# Patient Record
Sex: MALE | Race: OTHER | HISPANIC OR LATINO | URBAN - METROPOLITAN AREA
[De-identification: names, ages, dates, MRNs, and addresses within clinical notes are randomized per-mention and may not be internally consistent; named-entity substitution may affect disease eponyms.]

---

## 2018-06-03 ENCOUNTER — INPATIENT (INPATIENT)
Facility: HOSPITAL | Age: 43
LOS: 1 days | Discharge: HOME HEALTH | DRG: 348 | End: 2018-06-05
Attending: SPECIALIST | Admitting: SPECIALIST
Payer: MEDICAID

## 2018-06-03 ENCOUNTER — TRANSCRIPTION ENCOUNTER (OUTPATIENT)
Age: 43
End: 2018-06-03

## 2018-06-03 VITALS
RESPIRATION RATE: 18 BRPM | SYSTOLIC BLOOD PRESSURE: 120 MMHG | DIASTOLIC BLOOD PRESSURE: 79 MMHG | HEART RATE: 78 BPM | TEMPERATURE: 98 F | HEIGHT: 69 IN | OXYGEN SATURATION: 99 % | WEIGHT: 240.08 LBS

## 2018-06-03 DIAGNOSIS — E66.9 OBESITY, UNSPECIFIED: ICD-10-CM

## 2018-06-03 DIAGNOSIS — I00 RHEUMATIC FEVER WITHOUT HEART INVOLVEMENT: ICD-10-CM

## 2018-06-03 DIAGNOSIS — K61.0 ANAL ABSCESS: ICD-10-CM

## 2018-06-03 DIAGNOSIS — Z98.61 CORONARY ANGIOPLASTY STATUS: ICD-10-CM

## 2018-06-03 DIAGNOSIS — I50.22 CHRONIC SYSTOLIC (CONGESTIVE) HEART FAILURE: ICD-10-CM

## 2018-06-03 DIAGNOSIS — I25.10 ATHEROSCLEROTIC HEART DISEASE OF NATIVE CORONARY ARTERY WITHOUT ANGINA PECTORIS: ICD-10-CM

## 2018-06-03 DIAGNOSIS — I11.0 HYPERTENSIVE HEART DISEASE WITH HEART FAILURE: ICD-10-CM

## 2018-06-03 DIAGNOSIS — Z95.3 PRESENCE OF XENOGENIC HEART VALVE: ICD-10-CM

## 2018-06-03 DIAGNOSIS — Z95.2 PRESENCE OF PROSTHETIC HEART VALVE: Chronic | ICD-10-CM

## 2018-06-03 DIAGNOSIS — K61.1 RECTAL ABSCESS: ICD-10-CM

## 2018-06-03 DIAGNOSIS — L02.31 CUTANEOUS ABSCESS OF BUTTOCK: ICD-10-CM

## 2018-06-03 DIAGNOSIS — E78.5 HYPERLIPIDEMIA, UNSPECIFIED: ICD-10-CM

## 2018-06-03 DIAGNOSIS — K21.9 GASTRO-ESOPHAGEAL REFLUX DISEASE WITHOUT ESOPHAGITIS: ICD-10-CM

## 2018-06-03 LAB
ALBUMIN SERPL ELPH-MCNC: 3.8 G/DL — SIGNIFICANT CHANGE UP (ref 3.5–5)
ALP SERPL-CCNC: 76 U/L — SIGNIFICANT CHANGE UP (ref 40–120)
ALT FLD-CCNC: 30 U/L DA — SIGNIFICANT CHANGE UP (ref 10–60)
ANION GAP SERPL CALC-SCNC: 6 MMOL/L — SIGNIFICANT CHANGE UP (ref 5–17)
AST SERPL-CCNC: 19 U/L — SIGNIFICANT CHANGE UP (ref 10–40)
BILIRUB SERPL-MCNC: 0.6 MG/DL — SIGNIFICANT CHANGE UP (ref 0.2–1.2)
BUN SERPL-MCNC: 13 MG/DL — SIGNIFICANT CHANGE UP (ref 7–18)
CALCIUM SERPL-MCNC: 9.1 MG/DL — SIGNIFICANT CHANGE UP (ref 8.4–10.5)
CHLORIDE SERPL-SCNC: 106 MMOL/L — SIGNIFICANT CHANGE UP (ref 96–108)
CO2 SERPL-SCNC: 29 MMOL/L — SIGNIFICANT CHANGE UP (ref 22–31)
CREAT SERPL-MCNC: 0.95 MG/DL — SIGNIFICANT CHANGE UP (ref 0.5–1.3)
GLUCOSE SERPL-MCNC: 120 MG/DL — HIGH (ref 70–99)
HCT VFR BLD CALC: 42.4 % — SIGNIFICANT CHANGE UP (ref 39–50)
HGB BLD-MCNC: 14.4 G/DL — SIGNIFICANT CHANGE UP (ref 13–17)
MCHC RBC-ENTMCNC: 29.6 PG — SIGNIFICANT CHANGE UP (ref 27–34)
MCHC RBC-ENTMCNC: 34 GM/DL — SIGNIFICANT CHANGE UP (ref 32–36)
MCV RBC AUTO: 87 FL — SIGNIFICANT CHANGE UP (ref 80–100)
PLATELET # BLD AUTO: 188 K/UL — SIGNIFICANT CHANGE UP (ref 150–400)
POTASSIUM SERPL-MCNC: 4.2 MMOL/L — SIGNIFICANT CHANGE UP (ref 3.5–5.3)
POTASSIUM SERPL-SCNC: 4.2 MMOL/L — SIGNIFICANT CHANGE UP (ref 3.5–5.3)
PROT SERPL-MCNC: 7.5 G/DL — SIGNIFICANT CHANGE UP (ref 6–8.3)
RBC # BLD: 4.87 M/UL — SIGNIFICANT CHANGE UP (ref 4.2–5.8)
RBC # FLD: 12 % — SIGNIFICANT CHANGE UP (ref 10.3–14.5)
SODIUM SERPL-SCNC: 141 MMOL/L — SIGNIFICANT CHANGE UP (ref 135–145)
WBC # BLD: 5.6 K/UL — SIGNIFICANT CHANGE UP (ref 3.8–10.5)
WBC # FLD AUTO: 5.6 K/UL — SIGNIFICANT CHANGE UP (ref 3.8–10.5)

## 2018-06-03 PROCEDURE — 99285 EMERGENCY DEPT VISIT HI MDM: CPT

## 2018-06-03 PROCEDURE — 72193 CT PELVIS W/DYE: CPT | Mod: 26

## 2018-06-03 RX ORDER — ONDANSETRON 8 MG/1
4 TABLET, FILM COATED ORAL ONCE
Qty: 0 | Refills: 0 | Status: COMPLETED | OUTPATIENT
Start: 2018-06-03 | End: 2018-06-03

## 2018-06-03 RX ORDER — PIPERACILLIN AND TAZOBACTAM 4; .5 G/20ML; G/20ML
3.38 INJECTION, POWDER, LYOPHILIZED, FOR SOLUTION INTRAVENOUS EVERY 8 HOURS
Qty: 0 | Refills: 0 | Status: DISCONTINUED | OUTPATIENT
Start: 2018-06-03 | End: 2018-06-05

## 2018-06-03 RX ORDER — OXYCODONE AND ACETAMINOPHEN 5; 325 MG/1; MG/1
1 TABLET ORAL EVERY 4 HOURS
Qty: 0 | Refills: 0 | Status: DISCONTINUED | OUTPATIENT
Start: 2018-06-03 | End: 2018-06-05

## 2018-06-03 RX ORDER — PANTOPRAZOLE SODIUM 20 MG/1
40 TABLET, DELAYED RELEASE ORAL
Qty: 0 | Refills: 0 | Status: DISCONTINUED | OUTPATIENT
Start: 2018-06-03 | End: 2018-06-05

## 2018-06-03 RX ORDER — SODIUM CHLORIDE 9 MG/ML
1000 INJECTION INTRAMUSCULAR; INTRAVENOUS; SUBCUTANEOUS ONCE
Qty: 0 | Refills: 0 | Status: COMPLETED | OUTPATIENT
Start: 2018-06-03 | End: 2018-06-03

## 2018-06-03 RX ORDER — ONDANSETRON 8 MG/1
4 TABLET, FILM COATED ORAL EVERY 6 HOURS
Qty: 0 | Refills: 0 | Status: DISCONTINUED | OUTPATIENT
Start: 2018-06-03 | End: 2018-06-05

## 2018-06-03 RX ORDER — DEXTROSE MONOHYDRATE, SODIUM CHLORIDE, AND POTASSIUM CHLORIDE 50; .745; 4.5 G/1000ML; G/1000ML; G/1000ML
1000 INJECTION, SOLUTION INTRAVENOUS
Qty: 0 | Refills: 0 | Status: DISCONTINUED | OUTPATIENT
Start: 2018-06-03 | End: 2018-06-04

## 2018-06-03 RX ORDER — CARVEDILOL PHOSPHATE 80 MG/1
3.12 CAPSULE, EXTENDED RELEASE ORAL EVERY 12 HOURS
Qty: 0 | Refills: 0 | Status: DISCONTINUED | OUTPATIENT
Start: 2018-06-03 | End: 2018-06-05

## 2018-06-03 RX ORDER — ATORVASTATIN CALCIUM 80 MG/1
20 TABLET, FILM COATED ORAL AT BEDTIME
Qty: 0 | Refills: 0 | Status: DISCONTINUED | OUTPATIENT
Start: 2018-06-03 | End: 2018-06-05

## 2018-06-03 RX ORDER — ASPIRIN/CALCIUM CARB/MAGNESIUM 324 MG
81 TABLET ORAL DAILY
Qty: 0 | Refills: 0 | Status: DISCONTINUED | OUTPATIENT
Start: 2018-06-03 | End: 2018-06-05

## 2018-06-03 RX ORDER — LISINOPRIL 2.5 MG/1
10 TABLET ORAL DAILY
Qty: 0 | Refills: 0 | Status: DISCONTINUED | OUTPATIENT
Start: 2018-06-03 | End: 2018-06-05

## 2018-06-03 RX ORDER — HYDROMORPHONE HYDROCHLORIDE 2 MG/ML
1 INJECTION INTRAMUSCULAR; INTRAVENOUS; SUBCUTANEOUS EVERY 4 HOURS
Qty: 0 | Refills: 0 | Status: DISCONTINUED | OUTPATIENT
Start: 2018-06-03 | End: 2018-06-04

## 2018-06-03 RX ADMIN — ONDANSETRON 4 MILLIGRAM(S): 8 TABLET, FILM COATED ORAL at 17:58

## 2018-06-03 RX ADMIN — CARVEDILOL PHOSPHATE 3.12 MILLIGRAM(S): 80 CAPSULE, EXTENDED RELEASE ORAL at 16:38

## 2018-06-03 RX ADMIN — HYDROMORPHONE HYDROCHLORIDE 1 MILLIGRAM(S): 2 INJECTION INTRAMUSCULAR; INTRAVENOUS; SUBCUTANEOUS at 21:03

## 2018-06-03 RX ADMIN — ATORVASTATIN CALCIUM 20 MILLIGRAM(S): 80 TABLET, FILM COATED ORAL at 16:38

## 2018-06-03 RX ADMIN — HYDROMORPHONE HYDROCHLORIDE 1 MILLIGRAM(S): 2 INJECTION INTRAMUSCULAR; INTRAVENOUS; SUBCUTANEOUS at 22:00

## 2018-06-03 RX ADMIN — SODIUM CHLORIDE 1000 MILLILITER(S): 9 INJECTION INTRAMUSCULAR; INTRAVENOUS; SUBCUTANEOUS at 14:49

## 2018-06-03 RX ADMIN — PIPERACILLIN AND TAZOBACTAM 25 GRAM(S): 4; .5 INJECTION, POWDER, LYOPHILIZED, FOR SOLUTION INTRAVENOUS at 21:42

## 2018-06-03 RX ADMIN — OXYCODONE AND ACETAMINOPHEN 1 TABLET(S): 5; 325 TABLET ORAL at 13:41

## 2018-06-03 RX ADMIN — HYDROMORPHONE HYDROCHLORIDE 1 MILLIGRAM(S): 2 INJECTION INTRAMUSCULAR; INTRAVENOUS; SUBCUTANEOUS at 16:38

## 2018-06-03 RX ADMIN — OXYCODONE AND ACETAMINOPHEN 1 TABLET(S): 5; 325 TABLET ORAL at 16:06

## 2018-06-03 RX ADMIN — PANTOPRAZOLE SODIUM 40 MILLIGRAM(S): 20 TABLET, DELAYED RELEASE ORAL at 16:38

## 2018-06-03 RX ADMIN — HYDROMORPHONE HYDROCHLORIDE 1 MILLIGRAM(S): 2 INJECTION INTRAMUSCULAR; INTRAVENOUS; SUBCUTANEOUS at 21:42

## 2018-06-03 NOTE — ED PROVIDER NOTE - OBJECTIVE STATEMENT
43 y/o male with no significant PMHx presents to the ED c/o rectal pain x 3 days. Pt notes pain and swelling to anal area. Pt also notes scant blood on toilet paper after wiping. Pt denies N/V/D, or any other complaints. NKDA

## 2018-06-03 NOTE — H&P ADULT - PROBLEM SELECTOR PLAN 1
Right gluteal abscess  Admit  start Zosyn, flagyl  Obtain CT pelvis   hold Ticagrelor  Preop for Surgery

## 2018-06-03 NOTE — H&P ADULT - HISTORY OF PRESENT ILLNESS
41 y/o male with no significant PMHx presents to the ED c/o rectal pain x 3 days. Pt notes pain and swelling to anal area.   Pt also notes scant blood on toilet paper after wiping. Pt denies N/V/D, or any other complaints. NKDA

## 2018-06-03 NOTE — H&P ADULT - NSHPPHYSICALEXAM_GEN_ALL_CORE
PHYSICAL EXAM:    GENERAL: NAD, well-groomed, well-developed  HEAD:  Atraumatic, Normocephalic  EYES: EOMI, PERRL, conjunctiva and sclera clear  ENMT: Moist mucous membranes, Good dentition, No lesions  NECK: Supple, No JVD  NERVOUS SYSTEM:  Alert & Oriented X3, Good concentration  CHEST/LUNG: Clear to percussion bilaterally; Normal respiratory effort  HEART: Regular rate and rhythm  ABDOMEN: Soft, Nontender, Nondistended; Bowel sounds present  Right gluteal abscess  : normal external genitalia  BREASTS: no breast lumps  EXTREMITIES:  No clubbing, cyanosis, or edema  VASC: equal peripheral pulses  LYMPH: No lymphadenopathy noted  SKIN: No rashes or lesions  PSYCH: normal affect

## 2018-06-04 LAB
APPEARANCE UR: CLEAR — SIGNIFICANT CHANGE UP
BILIRUB UR-MCNC: NEGATIVE — SIGNIFICANT CHANGE UP
COLOR SPEC: YELLOW — SIGNIFICANT CHANGE UP
DIFF PNL FLD: NEGATIVE — SIGNIFICANT CHANGE UP
GLUCOSE UR QL: NEGATIVE — SIGNIFICANT CHANGE UP
KETONES UR-MCNC: NEGATIVE — SIGNIFICANT CHANGE UP
LEUKOCYTE ESTERASE UR-ACNC: NEGATIVE — SIGNIFICANT CHANGE UP
NITRITE UR-MCNC: NEGATIVE — SIGNIFICANT CHANGE UP
PH UR: 7 — SIGNIFICANT CHANGE UP (ref 5–8)
PROT UR-MCNC: NEGATIVE — SIGNIFICANT CHANGE UP
SP GR SPEC: 1.01 — SIGNIFICANT CHANGE UP (ref 1.01–1.02)
UROBILINOGEN FLD QL: 8

## 2018-06-04 PROCEDURE — 46040 I&D ISCHIORCT&/PERIRCT ABSC: CPT

## 2018-06-04 RX ORDER — HYDROMORPHONE HYDROCHLORIDE 2 MG/ML
0.5 INJECTION INTRAMUSCULAR; INTRAVENOUS; SUBCUTANEOUS EVERY 6 HOURS
Qty: 0 | Refills: 0 | Status: DISCONTINUED | OUTPATIENT
Start: 2018-06-04 | End: 2018-06-05

## 2018-06-04 RX ORDER — ACETAMINOPHEN 500 MG
650 TABLET ORAL EVERY 6 HOURS
Qty: 0 | Refills: 0 | Status: DISCONTINUED | OUTPATIENT
Start: 2018-06-04 | End: 2018-06-05

## 2018-06-04 RX ORDER — DEXTROSE MONOHYDRATE, SODIUM CHLORIDE, AND POTASSIUM CHLORIDE 50; .745; 4.5 G/1000ML; G/1000ML; G/1000ML
1000 INJECTION, SOLUTION INTRAVENOUS
Qty: 0 | Refills: 0 | Status: DISCONTINUED | OUTPATIENT
Start: 2018-06-04 | End: 2018-06-05

## 2018-06-04 RX ORDER — TICAGRELOR 90 MG/1
90 TABLET ORAL
Qty: 0 | Refills: 0 | Status: DISCONTINUED | OUTPATIENT
Start: 2018-06-04 | End: 2018-06-05

## 2018-06-04 RX ORDER — ENOXAPARIN SODIUM 100 MG/ML
40 INJECTION SUBCUTANEOUS DAILY
Qty: 0 | Refills: 0 | Status: DISCONTINUED | OUTPATIENT
Start: 2018-06-04 | End: 2018-06-05

## 2018-06-04 RX ORDER — HYDROMORPHONE HYDROCHLORIDE 2 MG/ML
0.5 INJECTION INTRAMUSCULAR; INTRAVENOUS; SUBCUTANEOUS
Qty: 0 | Refills: 0 | Status: DISCONTINUED | OUTPATIENT
Start: 2018-06-04 | End: 2018-06-04

## 2018-06-04 RX ORDER — ONDANSETRON 8 MG/1
4 TABLET, FILM COATED ORAL ONCE
Qty: 0 | Refills: 0 | Status: DISCONTINUED | OUTPATIENT
Start: 2018-06-04 | End: 2018-06-04

## 2018-06-04 RX ORDER — DOCUSATE SODIUM 100 MG
100 CAPSULE ORAL
Qty: 0 | Refills: 0 | Status: DISCONTINUED | OUTPATIENT
Start: 2018-06-04 | End: 2018-06-05

## 2018-06-04 RX ADMIN — CARVEDILOL PHOSPHATE 3.12 MILLIGRAM(S): 80 CAPSULE, EXTENDED RELEASE ORAL at 22:54

## 2018-06-04 RX ADMIN — HYDROMORPHONE HYDROCHLORIDE 1 MILLIGRAM(S): 2 INJECTION INTRAMUSCULAR; INTRAVENOUS; SUBCUTANEOUS at 05:05

## 2018-06-04 RX ADMIN — PIPERACILLIN AND TAZOBACTAM 25 GRAM(S): 4; .5 INJECTION, POWDER, LYOPHILIZED, FOR SOLUTION INTRAVENOUS at 22:54

## 2018-06-04 RX ADMIN — HYDROMORPHONE HYDROCHLORIDE 1 MILLIGRAM(S): 2 INJECTION INTRAMUSCULAR; INTRAVENOUS; SUBCUTANEOUS at 01:15

## 2018-06-04 RX ADMIN — HYDROMORPHONE HYDROCHLORIDE 1 MILLIGRAM(S): 2 INJECTION INTRAMUSCULAR; INTRAVENOUS; SUBCUTANEOUS at 05:20

## 2018-06-04 RX ADMIN — PIPERACILLIN AND TAZOBACTAM 25 GRAM(S): 4; .5 INJECTION, POWDER, LYOPHILIZED, FOR SOLUTION INTRAVENOUS at 05:05

## 2018-06-04 RX ADMIN — ATORVASTATIN CALCIUM 20 MILLIGRAM(S): 80 TABLET, FILM COATED ORAL at 22:54

## 2018-06-04 RX ADMIN — HYDROMORPHONE HYDROCHLORIDE 1 MILLIGRAM(S): 2 INJECTION INTRAMUSCULAR; INTRAVENOUS; SUBCUTANEOUS at 01:30

## 2018-06-04 RX ADMIN — ONDANSETRON 4 MILLIGRAM(S): 8 TABLET, FILM COATED ORAL at 08:51

## 2018-06-04 RX ADMIN — HYDROMORPHONE HYDROCHLORIDE 1 MILLIGRAM(S): 2 INJECTION INTRAMUSCULAR; INTRAVENOUS; SUBCUTANEOUS at 11:57

## 2018-06-04 NOTE — PROGRESS NOTE ADULT - ASSESSMENT
pre op for EUA, I&D of right buttock abscess and possible fistulectomy today    1- npo  2- zofran for nausea  3- abx  4- pain control  5- will contact primary cardiologist prior to surgery

## 2018-06-04 NOTE — CONSULT NOTE ADULT - SUBJECTIVE AND OBJECTIVE BOX
CHIEF COMPLAINT:Patient is a 42yr old  Male who presents with a chief complaint of rectal pain.    HPI:  43 y/o male with  PMHx of CAD-s/p PTCA LAD 2016, Rheumatic fever-S/P MV repair, S/P Bio AVR,CHF,Obesity who presents to the ED c/o rectal pain x 3 days. Pt notes pain and swelling to anal area. Pt also notes scant blood on toilet paper after wiping. Pt denies N/V/D, or any other complaints.       PAST MEDICAL & SURGICAL HISTORY:  Rheumatic fever  H/O mitral valve repair  Aortic valve replaced-Bio  History of open heart surgery  CHF  CAD-s/p PTCA LAD 2016      MEDICATIONS  (STANDING):  aspirin enteric coated 81 milliGRAM(s) Oral daily  atorvastatin 20 milliGRAM(s) Oral at bedtime  carvedilol 3.125 milliGRAM(s) Oral every 12 hours  dextrose 5% + sodium chloride 0.45% with potassium chloride 20 mEq/L 1000 milliLiter(s) (50 mL/Hr) IV Continuous <Continuous>  enoxaparin Injectable 40 milliGRAM(s) SubCutaneous daily  lisinopril 10 milliGRAM(s) Oral daily  pantoprazole    Tablet 40 milliGRAM(s) Oral before breakfast  piperacillin/tazobactam IVPB. 3.375 Gram(s) IV Intermittent every 8 hours  ticagrelor 90 milliGRAM(s) Oral two times a day    MEDICATIONS  (PRN):  HYDROmorphone  Injectable 1 milliGRAM(s) IV Push every 4 hours PRN Moderate Pain  ondansetron Injectable 4 milliGRAM(s) IV Push every 6 hours PRN Nausea  oxyCODONE    5 mG/acetaminophen 325 mG 1 Tablet(s) Oral every 4 hours PRN Moderate Pain (4 - 6)      FAMILY HISTORY:  No pertinent family history in first degree relatives      SOCIAL HISTORY:    [ x] Non-smoker    [ x] Alcohol-denies    Allergies    No Known Allergies    Intolerances    	    REVIEW OF SYSTEMS:  CONSTITUTIONAL: No fever, weight loss, or fatigue  EYES: No eye pain, visual disturbances, or discharge  ENT:  No difficulty hearing, tinnitus, vertigo; No sinus or throat pain  NECK: No pain or stiffness  RESPIRATORY: No cough, wheezing, chills or hemoptysis; No Shortness of Breath  CARDIOVASCULAR: No chest pain, palpitations, passing out, dizziness, or leg swelling  GASTROINTESTINAL: No abdominal or epigastric pain. No nausea, vomiting, or hematemesis; No diarrhea or constipation. No melena or hematochezia.  GENITOURINARY: No dysuria, frequency, hematuria, or incontinence  NEUROLOGICAL: No headaches, memory loss, loss of strength, numbness, or tremors  SKIN: No itching, burning, rashes, or lesions   LYMPH Nodes: No enlarged glands  ENDOCRINE: No heat or cold intolerance; No hair loss  MUSCULOSKELETAL: No joint pain or swelling; No muscle, back, or extremity pain  PSYCHIATRIC: No depression, anxiety, mood swings, or difficulty sleeping  HEME/LYMPH: No easy bruising, or bleeding gums  ALLERGY AND IMMUNOLOGIC: No hives or eczema	      PHYSICAL EXAM:  T(C): 36.7 (06-04-18 @ 05:52), Max: 37.2 (06-03-18 @ 21:54)  HR: 64 (06-04-18 @ 05:52) (64 - 81)  BP: 124/70 (06-04-18 @ 05:52) (120/60 - 125/77)  RR: 18 (06-04-18 @ 05:52) (16 - 18)  SpO2: 97% (06-04-18 @ 05:52) (97% - 99%)  Wt(kg): --  I&O's Summary      Appearance: Normal	  HEENT:   Normal oral mucosa, PERRL, EOMI	  Lymphatic: No lymphadenopathy  Cardiovascular: Normal S1 S2, No JVD, No murmurs, No edema  Respiratory: Lungs clear to auscultation	  Psychiatry: A & O x 3, Mood & affect appropriate  Gastrointestinal:  Soft, Non-tender, + BS	  Skin: No rashes, No ecchymoses, No cyanosis	  Neurologic: Non-focal  Extremities: Normal range of motion, No clubbing, cyanosis or edema  Vascular: Peripheral pulses palpable 2+ bilaterally    	    ECG:  	not in chart    	  LABS:	 	                         14.4   5.6   )-----------( 188      ( 03 Jun 2018 14:28 )             42.4     06-03    141  |  106  |  13  ----------------------------<  120<H>  4.2   |  29  |  0.95    Ca    9.1      03 Jun 2018 14:28    TPro  7.5  /  Alb  3.8  /  TBili  0.6  /  DBili  x   /  AST  19  /  ALT  30  /  AlkPhos  76  06-03    EXAM:  CT PELVIS ONLY OC IC                            PROCEDURE DATE:  06/03/2018    ******PRELIMINARY REPORT******    ******PRELIMINARY REPORT******              INTERPRETATION:  Prelim:    Thick-walled air-containing focus along the right perianal fat along the   medial gluteal fold measuring 2.0 x 1.2 x 2.4 cm likely a small abscess   or fistula. No abnormality along the visualized abdomen.        Echo 2016:    Observations:  Mitral Valve: Patient status-post mitral valve repair. An  annuloplasty ring is seen in the mitral position. Mild,  eccentric, posteriorly-directed mitral regurgitation.  Mean  transmitral valve gradient equals 2 mm Hg, which is  probably normal in the setting of an annuloplasty ring.  Aortic Valve/Aorta: Bioprosthetic aortic valve replacement  is not welll visualized. Peak transaortic valve gradient  equals 6 mm Hg, mean transaortic valve gradient equals 3 mm  Hg, which is probably normal in the presence of a  prosthetic valve. No aortic valve regurgitation seen. Peak  left ventricular outflow tract gradient equals 2.2 mm Hg,  mean gradient is equal to 1 mm Hg.  Upper limits of normal aortic root size for BSA.  (Ao: 4.1  cm at the sinuses of Valsalva).  Left Atrium: Normal left atrium.  LA volume index = 26  cc/m2.  Left Ventricle: Endocardial visualization enhanced with  intravenous injection of echo contrast (Definity). Severe  left ventricular systolic dysfunction. EF=25-30% by visual  estimation. The distal septum anddistal inferior segments  are dyskinetic. This may be due to post-operative septal  motion related to prior open-heart surgery. Normal left  ventricular internal dimensions and wall thicknesses.  Right Heart: Normal right atrium. The right ventricle is  not well visualized; grossly normal right ventricular size  and systolic function. Tricuspid valve not well visualized,  probably normal. Minimal tricuspid regurgitation. Pulmonic  valve is not well visualized; apppears calcified with  decreased opening. Peak transvalvular gradient: 74 mm Hg,  mean gradient 41 mm Hg, consistent with severe pulmonic  stenosis. Mild-moderate pulmonic regurgitation.  Pericardium/Pleura: Normal pericardium with no pericardial  effusion.  Hemodynamic: Estimated right atrial pressure is 8 mm Hg.  Inadequate tricuspid regurgitation Doppler envelope  precludes estimation of RVSP.    CORONARY VESSELS:  LAD:   --  Proximal LAD: There was a 99 % stenosis.  RIGHT LOWER EXTREMITY VESSELS: Angiography of the right leg (limited to the  common femoral and its proximal branches) demonstrated mild  atherosclerotic disease.  COMPLICATIONS: There were no complications.  SUMMARY:  RIGHT LOWER EXTREMITY VESSELS: Angiography of the right leg(limited to the  common femoral and its proximal branches) demonstrated mild  atherosclerotic disease.  DIAGNOSTIC IMPRESSIONS: There is significant single vessel coronary artery  disease. The LAD was stented using 2 drug eluting stents.  DIAGNOSTIC RECOMMENDATIONS: Add Brilinta.  Continue ASA.  INTERVENTIONAL RECOMMENDATIONS: Add Brilinta.  Continue ASA.  Prepared and signed by  Giorgio Dumont DO  Signed 03/10/2016 14:45:06

## 2018-06-04 NOTE — BRIEF OPERATIVE NOTE - PROCEDURE
<<-----Click on this checkbox to enter Procedure Incision and drainage of perirectal abscess with patient in lithotomy position  06/04/2018    Active  NIKKI

## 2018-06-04 NOTE — PROGRESS NOTE ADULT - SUBJECTIVE AND OBJECTIVE BOX
c/o some nausea and constipation secondary to pain  has h/o mitral valve replacement- sees Dr Canada as primary cardiologist  for OR today    Vital Signs:  T(C): 36.7 (06-04-18 @ 05:52), Max: 37.2 (06-03-18 @ 21:54)  HR: 64 (06-04-18 @ 05:52) (64 - 81)  BP: 124/70 (06-04-18 @ 05:52) (120/60 - 125/77)  RR: 18 (06-04-18 @ 05:52) (16 - 18)  SpO2: 97% (06-04-18 @ 05:52) (97% - 99%)  Wt(kg): --    Physical Exam:  General: NAD, comfortable  Abdomen: soft, distended, nontender  Rectal: HTADDEUS deferred 2* to pain. to the right side of the rectum there is induration and a draining abcess that is tender    < from: CT Pelvis w/ Oral Cont and w/ IV Cont (06.03.18 @ 18:42) >  Thick-walled air-containing focus along the right perianal fat along the   medial gluteal fold measuring 2.0 x 1.2 x 2.4 cm likely a small abscess   or fistula. No abnormality along the visualized abdomen.    < end of copied text >                          14.4   5.6   )-----------( 188      ( 03 Jun 2018 14:28 )             42.4

## 2018-06-04 NOTE — CONSULT NOTE ADULT - ASSESSMENT
41 y/o male with  PMHx of CAD-s/p PTCA LAD 2016, Rheumatic fever-S/P MV repair, S/P Bio AVR,CHF,Obesity who presents to the ED c/o rectal pain x 3 days.  1.ABX.  2.Dec IVF -50cc/hr.  3.Echocardiogram-eval lv fx, mv and av.  4.CAD-asa,statin,brillinta.  5.CHF-coreg,ACE.  6.GI and DVT prophylaxis.  7.Pt at moderate risk for mike-operative cardiac complication.

## 2018-06-05 ENCOUNTER — TRANSCRIPTION ENCOUNTER (OUTPATIENT)
Age: 43
End: 2018-06-05

## 2018-06-05 VITALS
OXYGEN SATURATION: 98 % | SYSTOLIC BLOOD PRESSURE: 127 MMHG | DIASTOLIC BLOOD PRESSURE: 75 MMHG | RESPIRATION RATE: 16 BRPM | TEMPERATURE: 98 F | HEART RATE: 61 BPM

## 2018-06-05 PROBLEM — Z00.00 ENCOUNTER FOR PREVENTIVE HEALTH EXAMINATION: Status: ACTIVE | Noted: 2018-06-05

## 2018-06-05 LAB
ANION GAP SERPL CALC-SCNC: 8 MMOL/L — SIGNIFICANT CHANGE UP (ref 5–17)
BASOPHILS # BLD AUTO: 0.1 K/UL — SIGNIFICANT CHANGE UP (ref 0–0.2)
BASOPHILS NFR BLD AUTO: 1.2 % — SIGNIFICANT CHANGE UP (ref 0–2)
BUN SERPL-MCNC: 11 MG/DL — SIGNIFICANT CHANGE UP (ref 7–18)
CALCIUM SERPL-MCNC: 8.8 MG/DL — SIGNIFICANT CHANGE UP (ref 8.4–10.5)
CHLORIDE SERPL-SCNC: 105 MMOL/L — SIGNIFICANT CHANGE UP (ref 96–108)
CO2 SERPL-SCNC: 26 MMOL/L — SIGNIFICANT CHANGE UP (ref 22–31)
CREAT SERPL-MCNC: 1.12 MG/DL — SIGNIFICANT CHANGE UP (ref 0.5–1.3)
EOSINOPHIL # BLD AUTO: 0.1 K/UL — SIGNIFICANT CHANGE UP (ref 0–0.5)
EOSINOPHIL NFR BLD AUTO: 2.1 % — SIGNIFICANT CHANGE UP (ref 0–6)
GLUCOSE SERPL-MCNC: 114 MG/DL — HIGH (ref 70–99)
HCT VFR BLD CALC: 38.1 % — LOW (ref 39–50)
HGB BLD-MCNC: 12.8 G/DL — LOW (ref 13–17)
LYMPHOCYTES # BLD AUTO: 1.9 K/UL — SIGNIFICANT CHANGE UP (ref 1–3.3)
LYMPHOCYTES # BLD AUTO: 30.9 % — SIGNIFICANT CHANGE UP (ref 13–44)
MCHC RBC-ENTMCNC: 29.6 PG — SIGNIFICANT CHANGE UP (ref 27–34)
MCHC RBC-ENTMCNC: 33.5 GM/DL — SIGNIFICANT CHANGE UP (ref 32–36)
MCV RBC AUTO: 88.5 FL — SIGNIFICANT CHANGE UP (ref 80–100)
MONOCYTES # BLD AUTO: 0.5 K/UL — SIGNIFICANT CHANGE UP (ref 0–0.9)
MONOCYTES NFR BLD AUTO: 8.1 % — SIGNIFICANT CHANGE UP (ref 2–14)
NEUTROPHILS # BLD AUTO: 3.5 K/UL — SIGNIFICANT CHANGE UP (ref 1.8–7.4)
NEUTROPHILS NFR BLD AUTO: 57.7 % — SIGNIFICANT CHANGE UP (ref 43–77)
PLATELET # BLD AUTO: 166 K/UL — SIGNIFICANT CHANGE UP (ref 150–400)
POTASSIUM SERPL-MCNC: 3.9 MMOL/L — SIGNIFICANT CHANGE UP (ref 3.5–5.3)
POTASSIUM SERPL-SCNC: 3.9 MMOL/L — SIGNIFICANT CHANGE UP (ref 3.5–5.3)
RBC # BLD: 4.31 M/UL — SIGNIFICANT CHANGE UP (ref 4.2–5.8)
RBC # FLD: 12.1 % — SIGNIFICANT CHANGE UP (ref 10.3–14.5)
SODIUM SERPL-SCNC: 139 MMOL/L — SIGNIFICANT CHANGE UP (ref 135–145)
WBC # BLD: 6 K/UL — SIGNIFICANT CHANGE UP (ref 3.8–10.5)
WBC # FLD AUTO: 6 K/UL — SIGNIFICANT CHANGE UP (ref 3.8–10.5)

## 2018-06-05 PROCEDURE — 93306 TTE W/DOPPLER COMPLETE: CPT

## 2018-06-05 PROCEDURE — 80053 COMPREHEN METABOLIC PANEL: CPT

## 2018-06-05 PROCEDURE — 86901 BLOOD TYPING SEROLOGIC RH(D): CPT

## 2018-06-05 PROCEDURE — 87186 SC STD MICRODIL/AGAR DIL: CPT

## 2018-06-05 PROCEDURE — 87205 SMEAR GRAM STAIN: CPT

## 2018-06-05 PROCEDURE — 72193 CT PELVIS W/DYE: CPT

## 2018-06-05 PROCEDURE — 99285 EMERGENCY DEPT VISIT HI MDM: CPT | Mod: 25

## 2018-06-05 PROCEDURE — 96375 TX/PRO/DX INJ NEW DRUG ADDON: CPT

## 2018-06-05 PROCEDURE — 80048 BASIC METABOLIC PNL TOTAL CA: CPT

## 2018-06-05 PROCEDURE — 96374 THER/PROPH/DIAG INJ IV PUSH: CPT

## 2018-06-05 PROCEDURE — 85027 COMPLETE CBC AUTOMATED: CPT

## 2018-06-05 PROCEDURE — 81003 URINALYSIS AUTO W/O SCOPE: CPT

## 2018-06-05 PROCEDURE — 86850 RBC ANTIBODY SCREEN: CPT

## 2018-06-05 PROCEDURE — 87070 CULTURE OTHR SPECIMN AEROBIC: CPT

## 2018-06-05 PROCEDURE — 86900 BLOOD TYPING SEROLOGIC ABO: CPT

## 2018-06-05 RX ORDER — SACUBITRIL AND VALSARTAN 24; 26 MG/1; MG/1
1 TABLET, FILM COATED ORAL
Qty: 0 | Refills: 0 | Status: DISCONTINUED | OUTPATIENT
Start: 2018-06-05 | End: 2018-06-05

## 2018-06-05 RX ORDER — CARVEDILOL PHOSPHATE 80 MG/1
6.25 CAPSULE, EXTENDED RELEASE ORAL EVERY 12 HOURS
Qty: 0 | Refills: 0 | Status: DISCONTINUED | OUTPATIENT
Start: 2018-06-05 | End: 2018-06-05

## 2018-06-05 RX ORDER — CARVEDILOL PHOSPHATE 80 MG/1
1 CAPSULE, EXTENDED RELEASE ORAL
Qty: 0 | Refills: 0 | DISCHARGE
Start: 2018-06-05

## 2018-06-05 RX ADMIN — OXYCODONE AND ACETAMINOPHEN 1 TABLET(S): 5; 325 TABLET ORAL at 05:40

## 2018-06-05 RX ADMIN — PIPERACILLIN AND TAZOBACTAM 25 GRAM(S): 4; .5 INJECTION, POWDER, LYOPHILIZED, FOR SOLUTION INTRAVENOUS at 06:02

## 2018-06-05 RX ADMIN — OXYCODONE AND ACETAMINOPHEN 1 TABLET(S): 5; 325 TABLET ORAL at 04:44

## 2018-06-05 RX ADMIN — PANTOPRAZOLE SODIUM 40 MILLIGRAM(S): 20 TABLET, DELAYED RELEASE ORAL at 06:03

## 2018-06-05 RX ADMIN — OXYCODONE AND ACETAMINOPHEN 1 TABLET(S): 5; 325 TABLET ORAL at 08:55

## 2018-06-05 RX ADMIN — OXYCODONE AND ACETAMINOPHEN 1 TABLET(S): 5; 325 TABLET ORAL at 08:25

## 2018-06-05 RX ADMIN — CARVEDILOL PHOSPHATE 3.12 MILLIGRAM(S): 80 CAPSULE, EXTENDED RELEASE ORAL at 06:03

## 2018-06-05 NOTE — DISCHARGE NOTE ADULT - PLAN OF CARE
wound healing please follow up with dr. alejandro within 1 week   Diet as tolerated   continue antibiotics   Daily wound care perirectal abscess 2 1/2 x 1x1cm to be dressed daily with 1/2 inch packing and covered with dry dressing continue current care and follow up with PCP

## 2018-06-05 NOTE — DISCHARGE NOTE ADULT - CARE PLAN
Principal Discharge DX:	Rectal abscess  Goal:	wound healing  Assessment and plan of treatment:	please follow up with dr. alejandro within 1 week   Diet as tolerated   continue antibiotics   Daily wound care perirectal abscess 2 1/2 x 1x1cm to be dressed daily with 1/2 inch packing and covered with dry dressing  Secondary Diagnosis:	Aortic valve replaced  Goal:	continue current care and follow up with PCP  Secondary Diagnosis:	H/O mitral valve replacement  Goal:	continue current care and follow up with PCP

## 2018-06-05 NOTE — PROGRESS NOTE ADULT - SUBJECTIVE AND OBJECTIVE BOX
S/p Incision and drainage of right gluteal abscess POD#1  Patient seen and examined at bedside, admits to pain. +BM    Vital Signs Last 24 Hrs  T(F): 98.1 (06-05-18 @ 05:38), Max: 99.2 (06-04-18 @ 17:24)  HR: 70 (06-05-18 @ 05:38)  BP: 141/72 (06-05-18 @ 05:38)  RR: 18 (06-05-18 @ 05:38)  SpO2: 99% (06-05-18 @ 05:38)    GENERAL: Alert, NAD  CHEST/LUNG: respirations nonlabored  BACK: surgical dressing and packing removed. Open wound, no active drainage.    I&O's Detail    04 Jun 2018 07:01  -  05 Jun 2018 07:00  --------------------------------------------------------  IN:    Lactated Ringers IV Bolus: 700 mL  Total IN: 700 mL    OUT:  Total OUT: 0 mL    Total NET: 700 mL    LABS:                        14.4   5.6   )-----------( 188      ( 03 Jun 2018 14:28 )             42.4     06-03    141  |  106  |  13  ----------------------------<  120<H>  4.2   |  29  |  0.95    Ca    9.1      03 Jun 2018 14:28    TPro  7.5  /  Alb  3.8  /  TBili  0.6  /  DBili  x   /  AST  19  /  ALT  30  /  AlkPhos  76  06-03    42y old Male s/p  Incision and drainage of right gluteal abscess POD#1with PMH Rheumatic fever, mitral valve replacement    - continue local wound care, with packing  - keep area clean, showers  - antibiotics per ID  - f/u labs  - continue home medication

## 2018-06-05 NOTE — PROGRESS NOTE ADULT - ASSESSMENT
41 y/o male with  PMHx of CAD-s/p PTCA LAD 2016, Rheumatic fever-S/P MV repair, S/P Bio AVR,CHF,Obesity who presents to the ED c/o rectal pain x 3 days, s/p I and D.  1.ABX.  2.CAD-asa,statin,brillinta.  3.CHF-Inc coreg 6.25mg bid, Resume Entresto  4.GI and DVT prophylaxis.  5.Will need stress test and MUGA as outpatient, 1-2 weeks at our office.

## 2018-06-05 NOTE — PROGRESS NOTE ADULT - SUBJECTIVE AND OBJECTIVE BOX
CHIEF COMPLAINT:Patient is a 42y old  Male who presents with a chief complaint of   	  REVIEW OF SYSTEMS:  CONSTITUTIONAL: No fever, weight loss, or fatigue  EYES: No eye pain, visual disturbances, or discharge  ENT:  No difficulty hearing, tinnitus, vertigo; No sinus or throat pain  NECK: No pain or stiffness  RESPIRATORY: No cough, wheezing, chills or hemoptysis; No Shortness of Breath  CARDIOVASCULAR: No chest pain, palpitations, passing out, dizziness, or leg swelling  GASTROINTESTINAL: No abdominal or epigastric pain. No nausea, vomiting, or hematemesis; No diarrhea or constipation. No melena or hematochezia.  GENITOURINARY: No dysuria, frequency, hematuria, or incontinence  NEUROLOGICAL: No headaches, memory loss, loss of strength, numbness, or tremors  SKIN: No itching, burning, rashes, or lesions   LYMPH Nodes: No enlarged glands  ENDOCRINE: No heat or cold intolerance; No hair loss  MUSCULOSKELETAL: No joint pain or swelling; No muscle, back, or extremity pain  PSYCHIATRIC: No depression, anxiety, mood swings, or difficulty sleeping  HEME/LYMPH: No easy bruising, or bleeding gums  ALLERGY AND IMMUNOLOGIC: No hives or eczema	    [ ] All others negative	  [ ] Unable to obtain    PHYSICAL EXAM:  T(C): 36.7 (06-05-18 @ 05:38), Max: 37.3 (06-04-18 @ 17:24)  HR: 70 (06-05-18 @ 05:38) (61 - 77)  BP: 141/72 (06-05-18 @ 05:38) (111/67 - 149/94)  RR: 18 (06-05-18 @ 05:38) (14 - 19)  SpO2: 99% (06-05-18 @ 05:38) (95% - 100%)  Wt(kg): --  I&O's Summary    04 Jun 2018 07:01  -  05 Jun 2018 07:00  --------------------------------------------------------  IN: 700 mL / OUT: 0 mL / NET: 700 mL        Appearance: Normal	  HEENT:   Normal oral mucosa, PERRL, EOMI	  Lymphatic: No lymphadenopathy  Cardiovascular: Normal S1 S2, No JVD, No murmurs, No edema  Respiratory: Lungs clear to auscultation	  Psychiatry: A & O x 3, Mood & affect appropriate  Gastrointestinal:  Soft, Non-tender, + BS	  Skin: No rashes, No ecchymoses, No cyanosis	  Neurologic: Non-focal  Extremities: Normal range of motion, No clubbing, cyanosis or edema  Vascular: Peripheral pulses palpable 2+ bilaterally    MEDICATIONS  (STANDING):  aspirin enteric coated 81 milliGRAM(s) Oral daily  atorvastatin 20 milliGRAM(s) Oral at bedtime  carvedilol 6.25 milliGRAM(s) Oral every 12 hours  dextrose 5% + sodium chloride 0.45% with potassium chloride 20 mEq/L 1000 milliLiter(s) (50 mL/Hr) IV Continuous <Continuous>  docusate sodium 100 milliGRAM(s) Oral two times a day  enoxaparin Injectable 40 milliGRAM(s) SubCutaneous daily  pantoprazole    Tablet 40 milliGRAM(s) Oral before breakfast  piperacillin/tazobactam IVPB. 3.375 Gram(s) IV Intermittent every 8 hours  sacubitril 49 mG/valsartan 51 mG 1 Tablet(s) Oral two times a day  ticagrelor 90 milliGRAM(s) Oral two times a day      TELEMETRY: 	    ECG:  	    	  LABS:	 	                     12.8   6.0   )-----------( 166      ( 05 Jun 2018 07:29 )             38.1     06-05    139  |  105  |  11  ----------------------------<  114<H>  3.9   |  26  |  1.12    Ca    8.8      05 Jun 2018 07:29    TPro  7.5  /  Alb  3.8  /  TBili  0.6  /  DBili  x   /  AST  19  /  ALT  30  /  AlkPhos  76  06-03      OBSERVATIONS:  Mitral Valve: An annuloplasty ring seen in mitral position.  Mild mitral regurgitation.  Aortic Root: Aortic Root: 4.2 cm.    Aortic Valve: Bioprosthetic aortic valve replacement.  Left Atrium: Mild left atrial enlargement.  Left Ventricle: Endocardium not well visualized; grossly  severely reduced  left ventricular systolic function.  Normal left ventricular internal dimensions and wall  thicknesses. Grade III diastolic dysfunction.  Right Heart: Normal right atrium. Normal right ventricular  size and function. There is mild tricuspid regurgitation.  There is mild pulmonic regurgitation.  Pericardium/PleuraNormal pericardium with no pericardial  effusion.  Hemodynamic: Unable to estimate RVSP.

## 2018-06-05 NOTE — DISCHARGE NOTE ADULT - PATIENT PORTAL LINK FT
You can access the [facility] Patient Portal, offered by [facility], by registering with the following website: [email or web address]

## 2018-06-05 NOTE — DISCHARGE NOTE ADULT - MEDICATION SUMMARY - MEDICATIONS TO TAKE
I will START or STAY ON the medications listed below when I get home from the hospital:    Ecotrin Adult Low Strength 81 mg oral delayed release tablet  -- 1 tab(s) by mouth once a day MDD:1  -- Swallow whole.  Do not crush.  Take with food or milk.    -- Indication: For cad    oxyCODONE-acetaminophen 5 mg-325 mg oral tablet  -- 1 tab(s) by mouth every 6 hours, As Needed -Moderate Pain (4 - 6) MDD:4 tabs  -- Indication: For prn pain     ramipril 1.25 mg oral capsule  -- 1 cap(s) by mouth once a day MDD:1  -- Indication: For hTn     Lipitor 20 mg oral tablet  -- 1 tab(s) by mouth once a day (at bedtime)  -- Indication: For hld     ticagrelor 90 mg oral tablet  -- 1 tab(s) by mouth 2 times a day MDD:1  -- Indication: For cad    carvedilol 3.125 mg oral tablet  -- 1 tab(s) by mouth every 12 hours MDD:2 tabs/day  -- Indication: For htn     amoxicillin-clavulanate 875 mg-125 mg oral tablet  -- 875 milligram(s) by mouth every 12 hours   -- Finish all this medication unless otherwise directed by prescriber.  Take with food or milk.    -- Indication: For Rectal abscess    pantoprazole 40 mg oral delayed release tablet  -- 1 tab(s) by mouth once a day (before a meal) MDD:1  -- Indication: For Gerd I will START or STAY ON the medications listed below when I get home from the hospital:    Ecotrin Adult Low Strength 81 mg oral delayed release tablet  -- 1 tab(s) by mouth once a day MDD:1  -- Swallow whole.  Do not crush.  Take with food or milk.    -- Indication: For cad    oxyCODONE-acetaminophen 5 mg-325 mg oral tablet  -- 1 tab(s) by mouth every 6 hours, As Needed -Moderate Pain (4 - 6) MDD:4 tabs  -- Indication: For prn pain     ramipril 1.25 mg oral capsule  -- 1 cap(s) by mouth once a day MDD:1  -- Indication: For hTn     sacubitril-valsartan 49 mg-51 mg oral tablet  -- 1 tab(s) by mouth 2 times a day  -- Indication: For CHF    Lipitor 20 mg oral tablet  -- 1 tab(s) by mouth once a day (at bedtime)  -- Indication: For hld     ticagrelor 90 mg oral tablet  -- 1 tab(s) by mouth 2 times a day MDD:1  -- Indication: For cad    carvedilol 6.25 mg oral tablet  -- 1 tab(s) by mouth every 12 hours  -- Indication: For CHF    amoxicillin-clavulanate 875 mg-125 mg oral tablet  -- 875 milligram(s) by mouth every 12 hours   -- Finish all this medication unless otherwise directed by prescriber.  Take with food or milk.    -- Indication: For Rectal abscess    pantoprazole 40 mg oral delayed release tablet  -- 1 tab(s) by mouth once a day (before a meal) MDD:1  -- Indication: For Gerd I will START or STAY ON the medications listed below when I get home from the hospital:    Ecotrin Adult Low Strength 81 mg oral delayed release tablet  -- 1 tab(s) by mouth once a day MDD:1  -- Swallow whole.  Do not crush.  Take with food or milk.    -- Indication: For cad    oxyCODONE-acetaminophen 5 mg-325 mg oral tablet  -- 1 tab(s) by mouth every 6 hours, As Needed -Moderate Pain (4 - 6) MDD:4 tabs  -- Indication: For Abscess of rectum    ramipril 1.25 mg oral capsule  -- 1 cap(s) by mouth once a day MDD:1  -- Indication: For hTn     sacubitril-valsartan 49 mg-51 mg oral tablet  -- 1 tab(s) by mouth 2 times a day  -- Indication: For CHF    Lipitor 20 mg oral tablet  -- 1 tab(s) by mouth once a day (at bedtime)  -- Indication: For hld     ticagrelor 90 mg oral tablet  -- 1 tab(s) by mouth 2 times a day MDD:1  -- Indication: For cad    carvedilol 6.25 mg oral tablet  -- 1 tab(s) by mouth every 12 hours  -- Indication: For CHF    amoxicillin-clavulanate 875 mg-125 mg oral tablet  -- 875 milligram(s) by mouth every 12 hours   -- Finish all this medication unless otherwise directed by prescriber.  Take with food or milk.    -- Indication: For Abscess of rectum    pantoprazole 40 mg oral delayed release tablet  -- 1 tab(s) by mouth once a day (before a meal) MDD:1  -- Indication: For Gerd

## 2018-06-05 NOTE — DISCHARGE NOTE ADULT - HOSPITAL COURSE
43 y/o male with no significant PMHx presents to the ED c/o rectal pain x 3 days. Pt notes pain and swelling to anal area.   Pt also notes scant blood on toilet paper after wiping. Pt denies N/V/D, or any other complaints. NKDA  patient was taken to the OR on 6/4/18 and underwent incision and drainage of mike rectal abscess. patient tolerated procedure well. patient for d/c home with home care

## 2018-06-06 PROBLEM — Z86.39 HISTORY OF HIGH CHOLESTEROL: Status: RESOLVED | Noted: 2018-06-06 | Resolved: 2018-06-06

## 2018-06-06 PROBLEM — Z86.79 HISTORY OF RHEUMATIC FEVER: Status: RESOLVED | Noted: 2018-06-06 | Resolved: 2018-06-06

## 2018-06-06 PROBLEM — Z86.79 HISTORY OF ESSENTIAL HYPERTENSION: Status: RESOLVED | Noted: 2018-06-06 | Resolved: 2018-06-06

## 2018-06-06 LAB
-  AMIKACIN: SIGNIFICANT CHANGE UP
-  AMOXICILLIN/CLAVULANIC ACID: SIGNIFICANT CHANGE UP
-  AMPICILLIN/SULBACTAM: SIGNIFICANT CHANGE UP
-  AMPICILLIN: SIGNIFICANT CHANGE UP
-  AZTREONAM: SIGNIFICANT CHANGE UP
-  CEFAZOLIN: SIGNIFICANT CHANGE UP
-  CEFEPIME: SIGNIFICANT CHANGE UP
-  CEFOXITIN: SIGNIFICANT CHANGE UP
-  CEFTRIAXONE: SIGNIFICANT CHANGE UP
-  CIPROFLOXACIN: SIGNIFICANT CHANGE UP
-  ERTAPENEM: SIGNIFICANT CHANGE UP
-  GENTAMICIN: SIGNIFICANT CHANGE UP
-  IMIPENEM: SIGNIFICANT CHANGE UP
-  LEVOFLOXACIN: SIGNIFICANT CHANGE UP
-  MEROPENEM: SIGNIFICANT CHANGE UP
-  PIPERACILLIN/TAZOBACTAM: SIGNIFICANT CHANGE UP
-  TOBRAMYCIN: SIGNIFICANT CHANGE UP
-  TRIMETHOPRIM/SULFAMETHOXAZOLE: SIGNIFICANT CHANGE UP
METHOD TYPE: SIGNIFICANT CHANGE UP

## 2018-06-08 LAB
CULTURE RESULTS: SIGNIFICANT CHANGE UP
ORGANISM # SPEC MICROSCOPIC CNT: SIGNIFICANT CHANGE UP
ORGANISM # SPEC MICROSCOPIC CNT: SIGNIFICANT CHANGE UP
SPECIMEN SOURCE: SIGNIFICANT CHANGE UP

## 2018-06-12 ENCOUNTER — APPOINTMENT (OUTPATIENT)
Dept: SURGERY | Facility: CLINIC | Age: 43
End: 2018-06-12
Payer: COMMERCIAL

## 2018-06-12 DIAGNOSIS — Z86.39 PERSONAL HISTORY OF OTHER ENDOCRINE, NUTRITIONAL AND METABOLIC DISEASE: ICD-10-CM

## 2018-06-12 DIAGNOSIS — Z78.9 OTHER SPECIFIED HEALTH STATUS: ICD-10-CM

## 2018-06-12 DIAGNOSIS — Z86.79 PERSONAL HISTORY OF OTHER DISEASES OF THE CIRCULATORY SYSTEM: ICD-10-CM

## 2018-06-12 PROCEDURE — 99024 POSTOP FOLLOW-UP VISIT: CPT

## 2018-06-12 RX ORDER — AMOXICILLIN AND CLAVULANATE POTASSIUM 875; 125 MG/1; 1/1
875-125 TABLET, FILM COATED ORAL
Refills: 0 | Status: ACTIVE | COMMUNITY

## 2018-06-26 ENCOUNTER — APPOINTMENT (OUTPATIENT)
Dept: SURGERY | Facility: CLINIC | Age: 43
End: 2018-06-26
Payer: COMMERCIAL

## 2018-06-26 DIAGNOSIS — K61.1 RECTAL ABSCESS: ICD-10-CM

## 2018-06-26 PROCEDURE — 99024 POSTOP FOLLOW-UP VISIT: CPT

## 2018-08-28 ENCOUNTER — APPOINTMENT (OUTPATIENT)
Dept: SURGERY | Facility: CLINIC | Age: 43
End: 2018-08-28

## 2018-09-17 ENCOUNTER — INPATIENT (INPATIENT)
Facility: HOSPITAL | Age: 43
LOS: 2 days | Discharge: ANOTHER HOSPITAL | DRG: 309 | End: 2018-09-20
Attending: GENERAL PRACTICE | Admitting: GENERAL PRACTICE
Payer: MEDICAID

## 2018-09-17 DIAGNOSIS — Z95.2 PRESENCE OF PROSTHETIC HEART VALVE: Chronic | ICD-10-CM

## 2018-09-18 ENCOUNTER — TRANSCRIPTION ENCOUNTER (OUTPATIENT)
Age: 43
End: 2018-09-18

## 2018-09-18 VITALS
HEIGHT: 69 IN | HEART RATE: 56 BPM | DIASTOLIC BLOOD PRESSURE: 74 MMHG | WEIGHT: 300.05 LBS | OXYGEN SATURATION: 97 % | TEMPERATURE: 98 F | RESPIRATION RATE: 18 BRPM | SYSTOLIC BLOOD PRESSURE: 107 MMHG

## 2018-09-18 DIAGNOSIS — Z29.9 ENCOUNTER FOR PROPHYLACTIC MEASURES, UNSPECIFIED: ICD-10-CM

## 2018-09-18 DIAGNOSIS — I49.9 CARDIAC ARRHYTHMIA, UNSPECIFIED: ICD-10-CM

## 2018-09-18 DIAGNOSIS — I00 RHEUMATIC FEVER WITHOUT HEART INVOLVEMENT: ICD-10-CM

## 2018-09-18 DIAGNOSIS — I25.10 ATHEROSCLEROTIC HEART DISEASE OF NATIVE CORONARY ARTERY WITHOUT ANGINA PECTORIS: ICD-10-CM

## 2018-09-18 DIAGNOSIS — E78.5 HYPERLIPIDEMIA, UNSPECIFIED: ICD-10-CM

## 2018-09-18 DIAGNOSIS — R53.1 WEAKNESS: ICD-10-CM

## 2018-09-18 DIAGNOSIS — I10 ESSENTIAL (PRIMARY) HYPERTENSION: ICD-10-CM

## 2018-09-18 LAB
24R-OH-CALCIDIOL SERPL-MCNC: 20.4 NG/ML — LOW (ref 30–80)
ALBUMIN SERPL ELPH-MCNC: 3.9 G/DL — SIGNIFICANT CHANGE UP (ref 3.5–5)
ALP SERPL-CCNC: 88 U/L — SIGNIFICANT CHANGE UP (ref 40–120)
ALT FLD-CCNC: 56 U/L DA — SIGNIFICANT CHANGE UP (ref 10–60)
ANION GAP SERPL CALC-SCNC: 10 MMOL/L — SIGNIFICANT CHANGE UP (ref 5–17)
ANION GAP SERPL CALC-SCNC: 6 MMOL/L — SIGNIFICANT CHANGE UP (ref 5–17)
APPEARANCE UR: CLEAR — SIGNIFICANT CHANGE UP
AST SERPL-CCNC: 29 U/L — SIGNIFICANT CHANGE UP (ref 10–40)
BASOPHILS # BLD AUTO: 0.1 K/UL — SIGNIFICANT CHANGE UP (ref 0–0.2)
BASOPHILS # BLD AUTO: 0.1 K/UL — SIGNIFICANT CHANGE UP (ref 0–0.2)
BASOPHILS NFR BLD AUTO: 1.6 % — SIGNIFICANT CHANGE UP (ref 0–2)
BASOPHILS NFR BLD AUTO: 1.7 % — SIGNIFICANT CHANGE UP (ref 0–2)
BILIRUB SERPL-MCNC: 0.8 MG/DL — SIGNIFICANT CHANGE UP (ref 0.2–1.2)
BILIRUB UR-MCNC: NEGATIVE — SIGNIFICANT CHANGE UP
BUN SERPL-MCNC: 16 MG/DL — SIGNIFICANT CHANGE UP (ref 7–18)
BUN SERPL-MCNC: 23 MG/DL — HIGH (ref 7–18)
CALCIUM SERPL-MCNC: 8.9 MG/DL — SIGNIFICANT CHANGE UP (ref 8.4–10.5)
CALCIUM SERPL-MCNC: 8.9 MG/DL — SIGNIFICANT CHANGE UP (ref 8.4–10.5)
CHLORIDE SERPL-SCNC: 103 MMOL/L — SIGNIFICANT CHANGE UP (ref 96–108)
CHLORIDE SERPL-SCNC: 108 MMOL/L — SIGNIFICANT CHANGE UP (ref 96–108)
CHOLEST SERPL-MCNC: 116 MG/DL — SIGNIFICANT CHANGE UP (ref 10–199)
CK MB BLD-MCNC: 0.9 % — SIGNIFICANT CHANGE UP (ref 0–3.5)
CK MB BLD-MCNC: 1 % — SIGNIFICANT CHANGE UP (ref 0–3.5)
CK MB CFR SERPL CALC: 1.2 NG/ML — SIGNIFICANT CHANGE UP (ref 0–3.6)
CK MB CFR SERPL CALC: 1.3 NG/ML — SIGNIFICANT CHANGE UP (ref 0–3.6)
CK SERPL-CCNC: 127 U/L — SIGNIFICANT CHANGE UP (ref 35–232)
CK SERPL-CCNC: 139 U/L — SIGNIFICANT CHANGE UP (ref 35–232)
CO2 SERPL-SCNC: 23 MMOL/L — SIGNIFICANT CHANGE UP (ref 22–31)
CO2 SERPL-SCNC: 23 MMOL/L — SIGNIFICANT CHANGE UP (ref 22–31)
COLOR SPEC: YELLOW — SIGNIFICANT CHANGE UP
CREAT SERPL-MCNC: 0.88 MG/DL — SIGNIFICANT CHANGE UP (ref 0.5–1.3)
CREAT SERPL-MCNC: 0.92 MG/DL — SIGNIFICANT CHANGE UP (ref 0.5–1.3)
DIFF PNL FLD: NEGATIVE — SIGNIFICANT CHANGE UP
EOSINOPHIL # BLD AUTO: 0 K/UL — SIGNIFICANT CHANGE UP (ref 0–0.5)
EOSINOPHIL # BLD AUTO: 0.1 K/UL — SIGNIFICANT CHANGE UP (ref 0–0.5)
EOSINOPHIL NFR BLD AUTO: 0.8 % — SIGNIFICANT CHANGE UP (ref 0–6)
EOSINOPHIL NFR BLD AUTO: 1.2 % — SIGNIFICANT CHANGE UP (ref 0–6)
FOLATE SERPL-MCNC: 12.3 NG/ML — SIGNIFICANT CHANGE UP
GLUCOSE SERPL-MCNC: 108 MG/DL — HIGH (ref 70–99)
GLUCOSE SERPL-MCNC: 134 MG/DL — HIGH (ref 70–99)
GLUCOSE UR QL: NEGATIVE — SIGNIFICANT CHANGE UP
HBA1C BLD-MCNC: 6.2 % — HIGH (ref 4–5.6)
HCT VFR BLD CALC: 42.2 % — SIGNIFICANT CHANGE UP (ref 39–50)
HCT VFR BLD CALC: 42.6 % — SIGNIFICANT CHANGE UP (ref 39–50)
HDLC SERPL-MCNC: 41 MG/DL — SIGNIFICANT CHANGE UP
HGB BLD-MCNC: 14.4 G/DL — SIGNIFICANT CHANGE UP (ref 13–17)
HGB BLD-MCNC: 14.7 G/DL — SIGNIFICANT CHANGE UP (ref 13–17)
KETONES UR-MCNC: NEGATIVE — SIGNIFICANT CHANGE UP
LEUKOCYTE ESTERASE UR-ACNC: NEGATIVE — SIGNIFICANT CHANGE UP
LIPID PNL WITH DIRECT LDL SERPL: 62 MG/DL — SIGNIFICANT CHANGE UP
LYMPHOCYTES # BLD AUTO: 2.2 K/UL — SIGNIFICANT CHANGE UP (ref 1–3.3)
LYMPHOCYTES # BLD AUTO: 2.4 K/UL — SIGNIFICANT CHANGE UP (ref 1–3.3)
LYMPHOCYTES # BLD AUTO: 40.9 % — SIGNIFICANT CHANGE UP (ref 13–44)
LYMPHOCYTES # BLD AUTO: 46.2 % — HIGH (ref 13–44)
MAGNESIUM SERPL-MCNC: 2.3 MG/DL — SIGNIFICANT CHANGE UP (ref 1.6–2.6)
MCHC RBC-ENTMCNC: 29 PG — SIGNIFICANT CHANGE UP (ref 27–34)
MCHC RBC-ENTMCNC: 29.5 PG — SIGNIFICANT CHANGE UP (ref 27–34)
MCHC RBC-ENTMCNC: 34 GM/DL — SIGNIFICANT CHANGE UP (ref 32–36)
MCHC RBC-ENTMCNC: 34.8 GM/DL — SIGNIFICANT CHANGE UP (ref 32–36)
MCV RBC AUTO: 84.9 FL — SIGNIFICANT CHANGE UP (ref 80–100)
MCV RBC AUTO: 85.4 FL — SIGNIFICANT CHANGE UP (ref 80–100)
MONOCYTES # BLD AUTO: 0.4 K/UL — SIGNIFICANT CHANGE UP (ref 0–0.9)
MONOCYTES # BLD AUTO: 0.5 K/UL — SIGNIFICANT CHANGE UP (ref 0–0.9)
MONOCYTES NFR BLD AUTO: 7.8 % — SIGNIFICANT CHANGE UP (ref 2–14)
MONOCYTES NFR BLD AUTO: 9.3 % — SIGNIFICANT CHANGE UP (ref 2–14)
NEUTROPHILS # BLD AUTO: 2 K/UL — SIGNIFICANT CHANGE UP (ref 1.8–7.4)
NEUTROPHILS # BLD AUTO: 2.9 K/UL — SIGNIFICANT CHANGE UP (ref 1.8–7.4)
NEUTROPHILS NFR BLD AUTO: 41.9 % — LOW (ref 43–77)
NEUTROPHILS NFR BLD AUTO: 48.5 % — SIGNIFICANT CHANGE UP (ref 43–77)
NITRITE UR-MCNC: NEGATIVE — SIGNIFICANT CHANGE UP
PH UR: 5 — SIGNIFICANT CHANGE UP (ref 5–8)
PHOSPHATE SERPL-MCNC: 3 MG/DL — SIGNIFICANT CHANGE UP (ref 2.5–4.5)
PLATELET # BLD AUTO: 206 K/UL — SIGNIFICANT CHANGE UP (ref 150–400)
PLATELET # BLD AUTO: 224 K/UL — SIGNIFICANT CHANGE UP (ref 150–400)
POTASSIUM SERPL-MCNC: 3.9 MMOL/L — SIGNIFICANT CHANGE UP (ref 3.5–5.3)
POTASSIUM SERPL-MCNC: 4 MMOL/L — SIGNIFICANT CHANGE UP (ref 3.5–5.3)
POTASSIUM SERPL-SCNC: 3.9 MMOL/L — SIGNIFICANT CHANGE UP (ref 3.5–5.3)
POTASSIUM SERPL-SCNC: 4 MMOL/L — SIGNIFICANT CHANGE UP (ref 3.5–5.3)
PROT SERPL-MCNC: 7.7 G/DL — SIGNIFICANT CHANGE UP (ref 6–8.3)
PROT UR-MCNC: NEGATIVE — SIGNIFICANT CHANGE UP
RBC # BLD: 4.97 M/UL — SIGNIFICANT CHANGE UP (ref 4.2–5.8)
RBC # BLD: 4.98 M/UL — SIGNIFICANT CHANGE UP (ref 4.2–5.8)
RBC # FLD: 11.2 % — SIGNIFICANT CHANGE UP (ref 10.3–14.5)
RBC # FLD: 11.5 % — SIGNIFICANT CHANGE UP (ref 10.3–14.5)
SODIUM SERPL-SCNC: 136 MMOL/L — SIGNIFICANT CHANGE UP (ref 135–145)
SODIUM SERPL-SCNC: 137 MMOL/L — SIGNIFICANT CHANGE UP (ref 135–145)
SP GR SPEC: 1.01 — SIGNIFICANT CHANGE UP (ref 1.01–1.02)
TOTAL CHOLESTEROL/HDL RATIO MEASUREMENT: 2.8 RATIO — LOW (ref 3.4–9.6)
TRIGL SERPL-MCNC: 64 MG/DL — SIGNIFICANT CHANGE UP (ref 10–149)
TROPONIN I SERPL-MCNC: <0.015 NG/ML — SIGNIFICANT CHANGE UP (ref 0–0.04)
TSH SERPL-MCNC: 0.8 UU/ML — SIGNIFICANT CHANGE UP (ref 0.34–4.82)
UROBILINOGEN FLD QL: NEGATIVE — SIGNIFICANT CHANGE UP
VIT B12 SERPL-MCNC: 604 PG/ML — SIGNIFICANT CHANGE UP (ref 232–1245)
WBC # BLD: 4.8 K/UL — SIGNIFICANT CHANGE UP (ref 3.8–10.5)
WBC # BLD: 5.9 K/UL — SIGNIFICANT CHANGE UP (ref 3.8–10.5)
WBC # FLD AUTO: 4.8 K/UL — SIGNIFICANT CHANGE UP (ref 3.8–10.5)
WBC # FLD AUTO: 5.9 K/UL — SIGNIFICANT CHANGE UP (ref 3.8–10.5)

## 2018-09-18 PROCEDURE — 71045 X-RAY EXAM CHEST 1 VIEW: CPT | Mod: 26

## 2018-09-18 PROCEDURE — 99285 EMERGENCY DEPT VISIT HI MDM: CPT | Mod: 25

## 2018-09-18 RX ORDER — CARVEDILOL PHOSPHATE 80 MG/1
6.25 CAPSULE, EXTENDED RELEASE ORAL EVERY 12 HOURS
Qty: 0 | Refills: 0 | Status: DISCONTINUED | OUTPATIENT
Start: 2018-09-18 | End: 2018-09-20

## 2018-09-18 RX ORDER — PANTOPRAZOLE SODIUM 20 MG/1
40 TABLET, DELAYED RELEASE ORAL
Qty: 0 | Refills: 0 | Status: DISCONTINUED | OUTPATIENT
Start: 2018-09-18 | End: 2018-09-20

## 2018-09-18 RX ORDER — SODIUM CHLORIDE 9 MG/ML
1000 INJECTION INTRAMUSCULAR; INTRAVENOUS; SUBCUTANEOUS ONCE
Qty: 0 | Refills: 0 | Status: COMPLETED | OUTPATIENT
Start: 2018-09-18 | End: 2018-09-18

## 2018-09-18 RX ORDER — LISINOPRIL 2.5 MG/1
5 TABLET ORAL DAILY
Qty: 0 | Refills: 0 | Status: DISCONTINUED | OUTPATIENT
Start: 2018-09-18 | End: 2018-09-18

## 2018-09-18 RX ORDER — TICAGRELOR 90 MG/1
90 TABLET ORAL
Qty: 0 | Refills: 0 | Status: DISCONTINUED | OUTPATIENT
Start: 2018-09-18 | End: 2018-09-20

## 2018-09-18 RX ORDER — SODIUM CHLORIDE 9 MG/ML
3 INJECTION INTRAMUSCULAR; INTRAVENOUS; SUBCUTANEOUS ONCE
Qty: 0 | Refills: 0 | Status: COMPLETED | OUTPATIENT
Start: 2018-09-18 | End: 2018-09-18

## 2018-09-18 RX ORDER — SACUBITRIL AND VALSARTAN 24; 26 MG/1; MG/1
1 TABLET, FILM COATED ORAL
Qty: 0 | Refills: 0 | Status: DISCONTINUED | OUTPATIENT
Start: 2018-09-18 | End: 2018-09-20

## 2018-09-18 RX ORDER — ASPIRIN/CALCIUM CARB/MAGNESIUM 324 MG
81 TABLET ORAL DAILY
Qty: 0 | Refills: 0 | Status: DISCONTINUED | OUTPATIENT
Start: 2018-09-18 | End: 2018-09-20

## 2018-09-18 RX ORDER — ATORVASTATIN CALCIUM 80 MG/1
20 TABLET, FILM COATED ORAL AT BEDTIME
Qty: 0 | Refills: 0 | Status: DISCONTINUED | OUTPATIENT
Start: 2018-09-18 | End: 2018-09-20

## 2018-09-18 RX ADMIN — SACUBITRIL AND VALSARTAN 1 TABLET(S): 24; 26 TABLET, FILM COATED ORAL at 19:49

## 2018-09-18 RX ADMIN — ATORVASTATIN CALCIUM 20 MILLIGRAM(S): 80 TABLET, FILM COATED ORAL at 22:08

## 2018-09-18 RX ADMIN — SODIUM CHLORIDE 3 MILLILITER(S): 9 INJECTION INTRAMUSCULAR; INTRAVENOUS; SUBCUTANEOUS at 01:29

## 2018-09-18 RX ADMIN — SODIUM CHLORIDE 3000 MILLILITER(S): 9 INJECTION INTRAMUSCULAR; INTRAVENOUS; SUBCUTANEOUS at 01:29

## 2018-09-18 RX ADMIN — TICAGRELOR 90 MILLIGRAM(S): 90 TABLET ORAL at 19:48

## 2018-09-18 RX ADMIN — Medication 81 MILLIGRAM(S): at 11:20

## 2018-09-18 NOTE — PATIENT PROFILE ADULT. - ALCOHOL USE HISTORY SINGLE SELECT
Aware of the baby moving.    Has only gone for one treatment of IV Iron therapy.  Rescheduled to today at 1700 for her second dose.  Is only taking the prenatal vitamins.  Still declines to take the iron tabs.    Uncertain about what birth control she will use.  Does not want to take a pill.  Information on the various options provided to patient.    Return in 2 weeks.  
never

## 2018-09-18 NOTE — DISCHARGE NOTE ADULT - HOSPITAL COURSE
3M with PMHx of CAD-s/p PTCA LAD 2016, Rheumatic fever-S/P MV repair, S/P Bio AVR, CHF, Obesity who presents to ED with generalized weakness for last 2 weeks after he started diet. For last 2 weeks he was just eating salads and fruits and was avoiding all meats, bread, spicy and salty food and working out 1 hour on treadmill and reported that he lost about 5 pounds over time.   In ED In ED:  NS 1 l bolus was given vitals: 102/69, 58, 98.1, 17(98) Labs: WNL ECG showed ST changes , CE -ve . ECHO from june showed ef 30% with severely reduced systolic dysfunction. Pt will be transferred to [place] for cardiac cath . If no CAD then AICD placement    Pt stable for transfer as per attending Dr Correia

## 2018-09-18 NOTE — CHART NOTE - NSCHARTNOTEFT_GEN_A_CORE
addendum  pt agreeable to cath +/- AICD as needed  to be transferred to [place] on Thursday (post holiday)  continue med mgmt and tele otherwise.  Dr Lia to cover me tomorrow

## 2018-09-18 NOTE — CONSULT NOTE ADULT - SUBJECTIVE AND OBJECTIVE BOX
CHIEF COMPLAINT:Patient is a 43y old  Male who presents with a chief complaint of generalized weakness and lightheadedness for last 2 weeks.      HPI:  43 yr old Male with PMHx of CAD-s/p PTCA LAD 2016, Rheumatic fever-S/P MV repair, S/P Bio AVR, CHF, Obesity who presents to ED with generalized weakness for last 2 weeks after he started diet. For last 2 weeks he was just eating salads and fruits and was avoiding all meats, bread, spicy and salty food and working out 1 hour on treadmill and reported that he lost about 5 pounds over time. Since starting diet and exercise he was having generalized weakness. For last 3-4 days he started having headache and dizziness right after taking his medications. Yesterday he was feeling light headed after medications so he took his BP and it was around 80s/50s so he came to hospital for further management. Denies nausea, vomiting, diarrhea, abdominal pain, SOB, chest pain, MACIEL, palpitations, cough, wheezing, joint pain or swelling, fever, chills. (18 Sep 2018 06:23)      PAST MEDICAL & SURGICAL HISTORY:  Dyslipidemia  Hypertension  CAD (coronary artery disease)  Rheumatic fever  H/O mitral valve replacement  Aortic valve replaced  History of open heart surgery      MEDICATIONS  (STANDING):  aspirin enteric coated 81 milliGRAM(s) Oral daily  atorvastatin 20 milliGRAM(s) Oral at bedtime  carvedilol 6.25 milliGRAM(s) Oral every 12 hours  lisinopril 5 milliGRAM(s) Oral daily  pantoprazole    Tablet 40 milliGRAM(s) Oral before breakfast  sacubitril 49 mG/valsartan 51 mG 1 Tablet(s) Oral two times a day  ticagrelor 90 milliGRAM(s) Oral two times a day    MEDICATIONS  (PRN):      FAMILY HISTORY:  No pertinent family history in first degree relatives      SOCIAL HISTORY:    [x ] Non-smoker    [x ] Alcohol-denies    Allergies    No Known Allergies    Intolerances    	    REVIEW OF SYSTEMS:  CONSTITUTIONAL: No fever, weight loss, or fatigue  EYES: No eye pain, visual disturbances, or discharge  ENT:  No difficulty hearing, tinnitus, vertigo; No sinus or throat pain  NECK: No pain or stiffness  RESPIRATORY: No cough, wheezing, chills or hemoptysis; No Shortness of Breath  CARDIOVASCULAR: No chest pain, palpitations, passing out, dizziness, or leg swelling  GASTROINTESTINAL: No abdominal or epigastric pain. No nausea, vomiting, or hematemesis; No diarrhea or constipation. No melena or hematochezia.  GENITOURINARY: No dysuria, frequency, hematuria, or incontinence  NEUROLOGICAL: No headaches, memory loss, loss of strength, numbness, or tremors  SKIN: No itching, burning, rashes, or lesions   LYMPH Nodes: No enlarged glands  ENDOCRINE: No heat or cold intolerance; No hair loss  MUSCULOSKELETAL: No joint pain or swelling; No muscle, back, or extremity pain  PSYCHIATRIC: No depression, anxiety, mood swings, or difficulty sleeping  HEME/LYMPH: No easy bruising, or bleeding gums  ALLERGY AND IMMUNOLOGIC: No hives or eczema	      PHYSICAL EXAM:  T(C): 36.5 (09-18-18 @ 12:15), Max: 36.8 (09-18-18 @ 00:20)  HR: 51 (09-18-18 @ 12:15) (51 - 58)  BP: 122/70 (09-18-18 @ 12:15) (102/69 - 122/70)  RR: 18 (09-18-18 @ 12:15) (17 - 18)  SpO2: 99% (09-18-18 @ 12:15) (97% - 99%)      Appearance: Normal	  HEENT:   Normal oral mucosa, PERRL, EOMI	  Lymphatic: No lymphadenopathy  Cardiovascular: Normal S1 S2, No JVD, No murmurs, No edema  Respiratory: Lungs clear to auscultation	  Psychiatry: A & O x 3, Mood & affect appropriate  Gastrointestinal:  Soft, Non-tender, + BS	  Skin: No rashes, No ecchymoses, No cyanosis	  Neurologic: Non-focal  Extremities: Normal range of motion, No clubbing, cyanosis or edema  Vascular: Peripheral pulses palpable 2+ bilaterally    TELEMETRY: 	sr, pvc's,nsvt    ECG:  	NORMAL SINUS RHYTHM  LEFT AXIS DEVIATION  LEFT VENTRICULAR HYPERTROPHY WITH REPOLARIZATION ABNORMALITY    	  LABS:	 	    CARDIAC MARKERS:  CARDIAC MARKERS ( 18 Sep 2018 10:12 )  <0.015 ng/mL / x     / 139 U/L / x     / 1.2 ng/mL  CARDIAC MARKERS ( 18 Sep 2018 02:17 )  <0.015 ng/mL / x     / x     / x     / x                                  14.4   4.8   )-----------( 206      ( 18 Sep 2018 10:12 )             42.6     09-18    137  |  108  |  16  ----------------------------<  108<H>  4.0   |  23  |  0.88    Ca    8.9      18 Sep 2018 10:12  Phos  3.0     09-18  Mg     2.3     09-18    TPro  7.7  /  Alb  3.9  /  TBili  0.8  /  DBili  x   /  AST  29  /  ALT  56  /  AlkPhos  88  09-18      Lipid Profile: Cholesterol 116  LDL 62  HDL 41  TG 64      TSH: Thyroid Stimulating Hormone, Serum: 0.80 uU/mL (09-18 @ 10:12)        Echo 6/18:    CONCLUSIONS:  1. An annuloplasty ring seen in mitral position. Mild  mitral regurgitation.  2. Bioprosthetic aortic valve replacement.  3. Aortic Root: 4.2 cm.  4. Mild left atrial enlargement.  5. Normal left ventricular internal dimensions and wall  thicknesses.  6. Endocardium not well visualized; grossly severely  reduced  left ventricular systolic function.  7. Grade III diastolic dysfunction.  8. Normal right atrium.  9. Normal right ventricular size and function.  10. Unable to estimate RVSP.  11. There is mild tricuspid regurgitation.  12. There is mild pulmonic regurgitation.  13. Normal pericardium with no pericardial effusion.

## 2018-09-18 NOTE — CONSULT NOTE ADULT - PROVIDER SPECIALTY LIST ADULT
Cardiology [facility]    9/13/2017 1030  Max heart rate: 85%    60 year old    Wt Readings from Last 1 Encounters:   08/31/17 107.5 kg      Ht Readings from Last 1 Encounters:   08/31/17 5' 3\" (1.6 m)       Patient Type: Outpatient    Cardiac Markers: Not Applicable    Reason for test: ABNORMAL ECG    Primary MD:  KENNETH HERR   Surgeon:    Ordering MD: GRAYSON HANSEN  Other:    Cardiologist Performing Test: GRAYSON HANSEN    --------------------------------------------------------------------------------------------------------------------  HISTORY OF: Asthma / Lung Problems and Diabetic       PATIENT HAS HAD THE FOLLOWING IN THE LAST 24 HOURS:  [employer].    Current Outpatient Prescriptions   Medication Sig Dispense Refill   • tiZANidine (ZANAFLEX) 2 MG tablet Take 1 tablet by mouth every 8 hours as needed for Muscle spasms. 10 tablet 0   • doxycycline hyclate (VIBRAMYCIN) 100 MG capsule Take 1 capsule by mouth 2 times daily. 20 capsule 0   • Hydrocod Polst-Chlorphen Polst (TUSSIONEX PENNKINETIC ER) 10-8 MG/5ML Suspension Extended Release Take 5 mLs by mouth 2 times daily. Shake well 115 mL 0   • DULoxetine (CYMBALTA) 30 MG capsule Take 1 capsule by mouth daily. 30 capsule 5   • amLODIPine (NORVASC) 5 MG tablet Take 1 tablet by mouth daily. 90 tablet 1   • metformin (GLUCOPHAGE-XR) 500 MG 24 hr tablet Take 1 tablet by mouth daily (with breakfast). 90 tablet 1   • pantoprazole (PROTONIX) 40 MG tablet Take 1 tablet by mouth 2 times daily. 180 tablet 1   • fluticasone (FLONASE) 50 MCG/ACT nasal spray Spray 2 sprays in each nostril daily.     • Cholecalciferol (VITAMIN D3) 1000 UNITS capsule daily. Take 2 daily       No current facility-administered medications for this encounter.        ALLERGIES:   Allergen Reactions   • Lisinopril Cough     Side effects   • Losartan HEADACHES       MEETS CRITERIA FOR STRESS TEST: Yes         NOTES:     TYPE OF TEST: Exercise stress test and Myocardial Perfusion    ABLE TO WALK: Yes     NEEDED:  No

## 2018-09-18 NOTE — ED PROVIDER NOTE - MEDICAL DECISION MAKING DETAILS
Pt with Bigeminy alternating with Trigeminy. MAR endorsed. Pt agrees with admission for cardiac monitoring. I had a detailed discussion with the patient and/or guardian regarding the historical points, exam findings, and any diagnostic results supporting the admit diagnosis.

## 2018-09-18 NOTE — H&P ADULT - PROBLEM SELECTOR PLAN 7
IMPROVE VTE Individual Risk Assessment          RISK                                                          Points  [  ] Previous VTE                                                3  [  ] Thrombophilia                                             2  [  ] Lower limb paralysis                                   2        (unable to hold up >15 seconds)    [  ] Current Cancer                                             2         (within 6 months)  [  ] Immobilization > 24 hrs                              1  [  ] ICU/CCU stay > 24 hours                             1  [  ] Age > 60                                                         1    IMPROVE VTE Score: 0  no need for chemical anticoagulation

## 2018-09-18 NOTE — DISCHARGE NOTE ADULT - MEDICATION SUMMARY - MEDICATIONS TO TAKE
I will START or STAY ON the medications listed below when I get home from the hospital:    Ecotrin Adult Low Strength 81 mg oral delayed release tablet  -- 1 tab(s) by mouth once a day MDD:1  -- Swallow whole.  Do not crush.  Take with food or milk.    -- Indication: For CAD (coronary artery disease)    sacubitril-valsartan 49 mg-51 mg oral tablet  -- 1 tab(s) by mouth 2 times a day  -- Indication: For Hypertension    Lipitor 20 mg oral tablet  -- 1 tab(s) by mouth once a day (at bedtime)  -- Indication: For CAD (coronary artery disease)    ticagrelor 90 mg oral tablet  -- 1 tab(s) by mouth 2 times a day MDD:1  -- Indication: For CAD (coronary artery disease)    carvedilol 6.25 mg oral tablet  -- 1 tab(s) by mouth every 12 hours  -- Indication: For CAD (coronary artery disease)    pantoprazole 40 mg oral delayed release tablet  -- 1 tab(s) by mouth once a day (before a meal) MDD:1  -- Indication: For GI prophylaxis    ergocalciferol 50,000 intl units (1.25 mg) oral capsule  -- 1 cap(s) by mouth once a week  -- Indication: For Vitamin D supplement

## 2018-09-18 NOTE — DISCHARGE NOTE ADULT - CARE PLAN
Principal Discharge DX:	Weakness  Secondary Diagnosis:	CAD (coronary artery disease)  Secondary Diagnosis:	Dyslipidemia  Secondary Diagnosis:	Hypertension Principal Discharge DX:	Weakness  Goal:	Transfer to [facility] for cardiac catheterisation  Assessment and plan of treatment:	You came with weakness. Your ECG was abnormal , which changes concerning for ischemia. Your ECHO showed very reduced pumping function of the heart. Cardiologist recommended cardiac catheterisation and possible AICD placement. You will be transferred to [facility] for cardiac catheterisation.  Secondary Diagnosis:	CAD (coronary artery disease)  Goal:	compliance with medication  Assessment and plan of treatment:	You have history of CAD. keep taking the medications as prescribed, you will transferred to [facility] for cardiac catheterisation.  Secondary Diagnosis:	Dyslipidemia  Goal:	Prevent stroke and other cardiovascular events  Assessment and plan of treatment:	You have history of dyslipidemia. keep taking the prescribed medications and Follow up with your Primary Care Doctor in 1 week  Secondary Diagnosis:	Hypertension  Goal:	BP<140/90  Assessment and plan of treatment:	You have history of Hypertension. keep taking the prescribed medication and Follow up with your Primary Care Doctor in 1 week

## 2018-09-18 NOTE — H&P ADULT - PROBLEM SELECTOR PLAN 2
- rate controlled   bigeminal rhythm noted on EKG - asymptomatic at this time - rate controlled   bigeminal rhythm noted on EKG - asymptomatic at this time  cardiology follow up for episode of NSVT

## 2018-09-18 NOTE — H&P ADULT - PROBLEM SELECTOR PLAN 1
-likely 2/2 extensive execrise and diet with sudden weight loss over last 2 weeks   -orthostatic negative though BP is borderline  - will restart home medications with parameters -unclear etiololy: contibutions from cardiac arrythmia, CAD, diet and overexertion, with sudden weight loss over last 2 weeks   -orthostatic negative though BP is borderline  - will restart home medications with parameters  - cardio eval

## 2018-09-18 NOTE — DISCHARGE NOTE ADULT - PATIENT PORTAL LINK FT
You can access the [facility], offered by [facility], by registering with the following website: [email or web address]

## 2018-09-18 NOTE — ED ADULT NURSE REASSESSMENT NOTE - NS ED NURSE REASSESS COMMENT FT1
Pt aox3, running PVCs on cardiac monitor, NP Shannon and Dr. Evans jaffe,
pt received axox3 with cardiac monitor in progress no distress noted.
Pt remains stable, AOX3, no s/s of any distress noted. IV line in place, no signs of infiltration noted. Tolerating diet. VS WNL. Call bell in reach, bed in lowest position. Safety maintained, hourly roundings performed. As per Evans CARRINGTON, pt will stay in hospital for observation until thursday, when he will be transferred to [place].

## 2018-09-18 NOTE — DISCHARGE NOTE ADULT - CARE PROVIDER_API CALL
Rohan Canada (DO), Cardiology Medicine  [address] [id number]  Phone: [phone]  Fax: [phone]    Jane Krueger), Internal Medicine  [address] [place] [address]  Phone: [phone]  Fax: [phone]    Giorgio Dumont (DO), Cardiology; Interventional Cardiology  [address] [place] [address]  Phone: [phone]  Fax: [phone]    Jorge Correia (), Internal Medicine  [address] [place] [address]  Phone: [phone]  Fax: [phone]

## 2018-09-18 NOTE — CONSULT NOTE ADULT - ASSESSMENT
43 yr old Male with PMHx of CAD-s/p PTCA LAD 2016, Rheumatic fever-S/P MV repair, S/P Bio AVR, CHF, Obesity who presents to ED with generalized weakness,nsvt.  1.Tele monitoring.  2.CAD and CHF-cont cardiac medication.  3.Will d/w pt rec cardiac cath and subsequent AICD eval if no CAD.  4.GI and DVT prophylaxis.

## 2018-09-18 NOTE — ED PROVIDER NOTE - OBJECTIVE STATEMENT
CC of generalized weakness since starting diet 2 weeks ago. States has intermittent palpitations. No chest pain, no shortness of breath, no LOC.

## 2018-09-18 NOTE — H&P ADULT - ASSESSMENT
43M with PMHx of CAD-s/p PTCA LAD 2016, Rheumatic fever-S/P MV repair, S/P Bio AVR, CHF, Obesity who presents to ED with generalized weakness for last 2 weeks after he started diet. For last 2 weeks he was just eating salads and fruits and was avoiding all meats, bread, spicy and salty food and working out 1 hour on treadmill and reported that he lost about 5 pounds over time. Since starting diet and exercise he was having generalized weakness. For last 3-4 days he started having headache and dizziness right after taking his medications. Yesterday he was feeling light headed after medications so he took his BP and it was around 80s/50s so he came to hospital for further management. Denies nausea, vomiting, diarrhea, abdominal pain, SOB, chest pain, MACIEL, palpitations, cough, wheezing, joint pain or swelling, fever, chills.     In ED:   NS 1 l bolus was given   vitals: 102/69, 58, 98.1, 17(98)  Labs: WNL

## 2018-09-18 NOTE — H&P ADULT - NSHPREVIEWOFSYSTEMS_GEN_ALL_CORE
GEN: no fever, no chills, no pain  RESP: no SOB, no cough, no sputum  CVS: no chest pain, no palpitations, no edema  GI: no abdominal pain, no nausea, no vomiting, no constipation, no diarrhea  : no dysuria, no frequency, no hematuria  NEURO: no headache, no dizziness  PSYCH: no depression, not anxious  Derm : no itching, no rash

## 2018-09-18 NOTE — H&P ADULT - NSHPLABSRESULTS_GEN_ALL_CORE
Vital Signs Last 24 Hrs  T(C): 36.7 (18 Sep 2018 05:40), Max: 36.8 (18 Sep 2018 00:20)  T(F): 98.1 (18 Sep 2018 05:40), Max: 98.2 (18 Sep 2018 00:20)  HR: 58 (18 Sep 2018 05:40) (56 - 58)  BP: 102/69 (18 Sep 2018 05:40) (102/69 - 107/74)  RR: 17 (18 Sep 2018 05:40) (17 - 18)  SpO2: 98% (18 Sep 2018 05:40) (97% - 98%)                            14.7   5.9   )-----------( 224      ( 18 Sep 2018 02:17 )             42.2           136  |  103  |  23<H>  ----------------------------<  134<H>  3.9   |  23  |  0.92    Ca    8.9      18 Sep 2018 02:17    TPro  7.7  /  Alb  3.9  /  TBili  0.8  /  DBili  x   /  AST  29  /  ALT  56  /  AlkPhos  88        Urinalysis Basic - ( 18 Sep 2018 02:17 )    Color: Yellow / Appearance: Clear / S.010 / pH: x  Gluc: x / Ketone: Negative  / Bili: Negative / Urobili: Negative   Blood: x / Protein: Negative / Nitrite: Negative   Leuk Esterase: Negative / RBC: x / WBC x   Sq Epi: x / Non Sq Epi: x / Bacteria: x      CARDIAC MARKERS ( 18 Sep 2018 02:17 )  <0.015 ng/mL / x     / x     / x     / x

## 2018-09-18 NOTE — DISCHARGE NOTE ADULT - MEDICATION SUMMARY - MEDICATIONS TO STOP TAKING
I will STOP taking the medications listed below when I get home from the hospital:    ramipril 1.25 mg oral capsule  -- 1 cap(s) by mouth once a day MDD:1    oxyCODONE-acetaminophen 5 mg-325 mg oral tablet  -- 1 tab(s) by mouth every 6 hours, As Needed -Moderate Pain (4 - 6) MDD:4 tabs    amoxicillin-clavulanate 875 mg-125 mg oral tablet  -- 875 milligram(s) by mouth every 12 hours   -- Finish all this medication unless otherwise directed by prescriber.  Take with food or milk.    Brilinta (ticagrelor)    Entresto    aspirin    atorvastatin

## 2018-09-18 NOTE — H&P ADULT - HISTORY OF PRESENT ILLNESS
43M with PMHx of CAD-s/p PTCA LAD 2016, Rheumatic fever-S/P MV repair, S/P Bio AVR, CHF, Obesity who presents to ED with generalized weakness for last 2 weeks after he started diet. For last 2 weeks he was just eating salads and fruits and was avoiding all meats, bread, spicy and salty food and working out 1 hour on treadmill and reported that he lost about 5 pounds over time. Since starting diet and exercise he was having generalized weakness. For last 3-4 days he started having headache and dizziness right after taking his medications. Yesterday he was feeling light headed after medications so he took his BP and it was around 80s/50s so he came to hospital for further management. Denies nausea, vomiting, diarrhea, abdominal pain, SOB, chest pain, MACIEL, palpitations, cough, wheezing, joint pain or swelling, fever, chills. 43M with PMHx of CAD-s/p PTCA LAD 2016, Rheumatic fever-S/P MV repair, S/P Bio AVR, CHF, Obesity who presents to ED with generalized weakness for last 2 weeks after he started diet.   For last 2 weeks he was just eating salads and fruits and was avoiding all meats, bread, spicy and salty food and working out 1 hour on treadmill and reported that he lost about 5 pounds over time.   Since starting diet and exercise he was having generalized weakness. For last 3-4 days he started having headache and dizziness right after taking his medications.   Yesterday he was feeling light headed after medications so he took his BP and it was around 80s/50s so he came to hospital for further management.   Denies nausea, vomiting, diarrhea, abdominal pain, SOB, chest pain, MACIEL, palpitations, cough, wheezing, joint pain or swelling, fever, chills.

## 2018-09-18 NOTE — DISCHARGE NOTE ADULT - PLAN OF CARE
Transfer to [place] for cardiac catheterisation You came with weakness. Your ECG was abnormal , which changes concerning for ischemia. Your ECHO showed very reduced pumping function of the heart. Cardiologist recommended cardiac catheterisation and possible AICD placement. You will be transferred to [facility] for cardiac catheterisation. compliance with medication You have history of CAD. keep taking the medications as prescribed, you will transferred to [facility] for cardiac catheterisation. Prevent stroke and other cardiovascular events You have history of dyslipidemia. keep taking the prescribed medications and Follow up with your Primary Care Doctor in 1 week BP<140/90 You have history of Hypertension. keep taking the prescribed medication and Follow up with your Primary Care Doctor in 1 week

## 2018-09-19 LAB
ANION GAP SERPL CALC-SCNC: 7 MMOL/L — SIGNIFICANT CHANGE UP (ref 5–17)
BUN SERPL-MCNC: 13 MG/DL — SIGNIFICANT CHANGE UP (ref 7–18)
CALCIUM SERPL-MCNC: 9 MG/DL — SIGNIFICANT CHANGE UP (ref 8.4–10.5)
CHLORIDE SERPL-SCNC: 107 MMOL/L — SIGNIFICANT CHANGE UP (ref 96–108)
CO2 SERPL-SCNC: 25 MMOL/L — SIGNIFICANT CHANGE UP (ref 22–31)
CREAT SERPL-MCNC: 0.9 MG/DL — SIGNIFICANT CHANGE UP (ref 0.5–1.3)
GLUCOSE SERPL-MCNC: 107 MG/DL — HIGH (ref 70–99)
HCT VFR BLD CALC: 42.2 % — SIGNIFICANT CHANGE UP (ref 39–50)
HGB BLD-MCNC: 14.6 G/DL — SIGNIFICANT CHANGE UP (ref 13–17)
MCHC RBC-ENTMCNC: 29.7 PG — SIGNIFICANT CHANGE UP (ref 27–34)
MCHC RBC-ENTMCNC: 34.6 GM/DL — SIGNIFICANT CHANGE UP (ref 32–36)
MCV RBC AUTO: 85.6 FL — SIGNIFICANT CHANGE UP (ref 80–100)
PLATELET # BLD AUTO: 210 K/UL — SIGNIFICANT CHANGE UP (ref 150–400)
POTASSIUM SERPL-MCNC: 4.2 MMOL/L — SIGNIFICANT CHANGE UP (ref 3.5–5.3)
POTASSIUM SERPL-SCNC: 4.2 MMOL/L — SIGNIFICANT CHANGE UP (ref 3.5–5.3)
RBC # BLD: 4.93 M/UL — SIGNIFICANT CHANGE UP (ref 4.2–5.8)
RBC # FLD: 11.4 % — SIGNIFICANT CHANGE UP (ref 10.3–14.5)
SODIUM SERPL-SCNC: 139 MMOL/L — SIGNIFICANT CHANGE UP (ref 135–145)
WBC # BLD: 5.4 K/UL — SIGNIFICANT CHANGE UP (ref 3.8–10.5)
WBC # FLD AUTO: 5.4 K/UL — SIGNIFICANT CHANGE UP (ref 3.8–10.5)

## 2018-09-19 RX ORDER — ERGOCALCIFEROL 1.25 MG/1
50000 CAPSULE ORAL
Qty: 0 | Refills: 0 | Status: DISCONTINUED | OUTPATIENT
Start: 2018-09-19 | End: 2018-09-20

## 2018-09-19 RX ORDER — SACUBITRIL AND VALSARTAN 24; 26 MG/1; MG/1
0 TABLET, FILM COATED ORAL
Qty: 0 | Refills: 0 | COMMUNITY

## 2018-09-19 RX ORDER — ERGOCALCIFEROL 1.25 MG/1
1 CAPSULE ORAL
Qty: 4 | Refills: 0 | OUTPATIENT
Start: 2018-09-19 | End: 2018-10-18

## 2018-09-19 RX ORDER — ATORVASTATIN CALCIUM 80 MG/1
0 TABLET, FILM COATED ORAL
Qty: 0 | Refills: 0 | COMMUNITY

## 2018-09-19 RX ORDER — ASPIRIN/CALCIUM CARB/MAGNESIUM 324 MG
0 TABLET ORAL
Qty: 0 | Refills: 0 | COMMUNITY

## 2018-09-19 RX ORDER — TICAGRELOR 90 MG/1
0 TABLET ORAL
Qty: 0 | Refills: 0 | COMMUNITY

## 2018-09-19 RX ADMIN — TICAGRELOR 90 MILLIGRAM(S): 90 TABLET ORAL at 17:21

## 2018-09-19 RX ADMIN — Medication 81 MILLIGRAM(S): at 12:20

## 2018-09-19 RX ADMIN — ERGOCALCIFEROL 50000 UNIT(S): 1.25 CAPSULE ORAL at 12:20

## 2018-09-19 RX ADMIN — CARVEDILOL PHOSPHATE 6.25 MILLIGRAM(S): 80 CAPSULE, EXTENDED RELEASE ORAL at 17:21

## 2018-09-19 RX ADMIN — PANTOPRAZOLE SODIUM 40 MILLIGRAM(S): 20 TABLET, DELAYED RELEASE ORAL at 05:22

## 2018-09-19 RX ADMIN — SACUBITRIL AND VALSARTAN 1 TABLET(S): 24; 26 TABLET, FILM COATED ORAL at 17:21

## 2018-09-19 RX ADMIN — TICAGRELOR 90 MILLIGRAM(S): 90 TABLET ORAL at 05:22

## 2018-09-19 RX ADMIN — ATORVASTATIN CALCIUM 20 MILLIGRAM(S): 80 TABLET, FILM COATED ORAL at 22:05

## 2018-09-19 NOTE — PROGRESS NOTE ADULT - SUBJECTIVE AND OBJECTIVE BOX
HPI:  43M with PMHx of CAD-s/p PTCA LAD 2016, Rheumatic fever-S/P MV repair, S/P Bio AVR, CHF, Obesity who presents to ED with generalized weakness for last 2 weeks after he started diet.   For last 2 weeks he was just eating salads and fruits and was avoiding all meats, bread, spicy and salty food and working out 1 hour on treadmill and reported that he lost about 5 pounds over time.   Since starting diet and exercise he was having generalized weakness. For last 3-4 days he started having headache and dizziness right after taking his medications.   Yesterday he was feeling light headed after medications so he took his BP and it was around 80s/50s so he came to hospital for further management.   Denies nausea, vomiting, diarrhea, abdominal pain, SOB, chest pain, MACIEL, palpitations, cough, wheezing, joint pain or swelling, fever, chills. (18 Sep 2018 06:23)      Patient is a 43y old  Male who presents with a chief complaint of generalized weakness and lightheadedness for last 2 weeks (19 Sep 2018 15:53)      INTERVAL HPI/OVERNIGHT EVENTS:  T(C): 36.9 (18 @ 15:41), Max: 36.9 (18 @ 15:41)  HR: 57 (18 @ 15:41) (53 - 62)  BP: 117/66 (18 @ 15:41) (105/57 - 131/83)  RR: 18 (18 @ 15:41) (16 - 18)  SpO2: 98% (18 @ 15:41) (98% - 100%)  Wt(kg): --  I&O's Summary    18 Sep 2018 07:01  -  19 Sep 2018 07:00  --------------------------------------------------------  IN: 75 mL / OUT: 300 mL / NET: -225 mL        REVIEW OF SYSTEMS: denies fever, chills, SOB, palpitations, chest pain, abdominal pain, nausea, vomitting, diarrhea, constipation, dizziness    MEDICATIONS  (STANDING):  aspirin enteric coated 81 milliGRAM(s) Oral daily  atorvastatin 20 milliGRAM(s) Oral at bedtime  carvedilol 6.25 milliGRAM(s) Oral every 12 hours  ergocalciferol 89753 Unit(s) Oral <User Schedule>  pantoprazole    Tablet 40 milliGRAM(s) Oral before breakfast  sacubitril 49 mG/valsartan 51 mG 1 Tablet(s) Oral two times a day  ticagrelor 90 milliGRAM(s) Oral two times a day    MEDICATIONS  (PRN):      PHYSICAL EXAM:  GENERAL: NAD, well-groomed, well-developed  HEAD:  Atraumatic, Normocephalic  EYES: EOMI, PERRLA, conjunctiva and sclera clear  ENMT: No tonsillar erythema, exudates, or enlargement; Moist mucous membranes, Good dentition, No lesions  NECK: Supple, No JVD, Normal thyroid  NERVOUS SYSTEM:  Alert & Oriented X3, Good concentration; Motor Strength 5/5 B/L upper and lower extremities; DTRs 2+ intact and symmetric  CHEST/LUNG: Clear to percussion bilaterally; No rales, rhonchi, wheezing, or rubs  HEART: Regular rate and rhythm; No murmurs, rubs, or gallops  ABDOMEN: Soft, Nontender, Nondistended; Bowel sounds present  EXTREMITIES:  2+ Peripheral Pulses, No clubbing, cyanosis, or edema  LYMPH: No lymphadenopathy noted  SKIN: No rashes or lesions  LABS:                        14.6   5.4   )-----------( 210      ( 19 Sep 2018 07:57 )             42.2     -    139  |  107  |  13  ----------------------------<  107<H>  4.2   |  25  |  0.90    Ca    9.0      19 Sep 2018 07:57  Phos  3.0       Mg     2.3         TPro  7.7  /  Alb  3.9  /  TBili  0.8  /  DBili  x   /  AST  29  /  ALT  56  /  AlkPhos  88        Urinalysis Basic - ( 18 Sep 2018 02:17 )    Color: Yellow / Appearance: Clear / S.010 / pH: x  Gluc: x / Ketone: Negative  / Bili: Negative / Urobili: Negative   Blood: x / Protein: Negative / Nitrite: Negative   Leuk Esterase: Negative / RBC: x / WBC x   Sq Epi: x / Non Sq Epi: x / Bacteria: x      CAPILLARY BLOOD GLUCOSE            Urinalysis Basic - ( 18 Sep 2018 02:17 )    Color: Yellow / Appearance: Clear / S.010 / pH: x  Gluc: x / Ketone: Negative  / Bili: Negative / Urobili: Negative   Blood: x / Protein: Negative / Nitrite: Negative   Leuk Esterase: Negative / RBC: x / WBC x   Sq Epi: x / Non Sq Epi: x / Bacteria: x

## 2018-09-19 NOTE — PROGRESS NOTE ADULT - SUBJECTIVE AND OBJECTIVE BOX
Patient is a 43y old  Male who presents with a chief complaint of generalized weakness and lightheadedness for last 2 weeks (18 Sep 2018 15:49)      INTERVAL HPI/OVERNIGHT EVENTS: no new complaints    MEDICATIONS  (STANDING):  aspirin enteric coated 81 milliGRAM(s) Oral daily  atorvastatin 20 milliGRAM(s) Oral at bedtime  carvedilol 6.25 milliGRAM(s) Oral every 12 hours  ergocalciferol 48545 Unit(s) Oral <User Schedule>  pantoprazole    Tablet 40 milliGRAM(s) Oral before breakfast  sacubitril 49 mG/valsartan 51 mG 1 Tablet(s) Oral two times a day  ticagrelor 90 milliGRAM(s) Oral two times a day    MEDICATIONS  (PRN):      Allergies    No Known Allergies    Intolerances        REVIEW OF SYSTEMS:  CONSTITUTIONAL: No fever, weight loss, or fatigue  RESPIRATORY: No cough, wheezing, chills or hemoptysis; No shortness of breath  CARDIOVASCULAR: No chest pain, palpitations, dizziness, or leg swelling  GASTROINTESTINAL: No abdominal or epigastric pain. No nausea, vomiting, or hematemesis; No diarrhea or constipation. No melena or hematochezia.  NEUROLOGICAL: No headaches, memory loss, loss of strength, numbness, or tremors  SKIN: No itching, burning, rashes, or lesions     Vital Signs Last 24 Hrs  T(C): 36.9 (19 Sep 2018 15:41), Max: 36.9 (19 Sep 2018 15:41)  T(F): 98.4 (19 Sep 2018 15:41), Max: 98.4 (19 Sep 2018 15:41)  HR: 57 (19 Sep 2018 15:41) (53 - 62)  BP: 117/66 (19 Sep 2018 15:41) (105/57 - 131/83)  BP(mean): --  RR: 18 (19 Sep 2018 15:41) (16 - 18)  SpO2: 98% (19 Sep 2018 15:41) (98% - 100%)    PHYSICAL EXAM:  GENERAL: NAD,  HEAD:  Atraumatic, Normocephalic  EYES: EOMI, PERRLA, conjunctiva and sclera clear  NECK: Supple, No JVD, Normal thyroid  CHEST/LUNG: Clear to percussion bilaterally; No rales, rhonchi, wheezing, or rubs  HEART: Regular rate and rhythm; No murmurs, rubs, or gallops  ABDOMEN: Soft, Nontender, Nondistended; Bowel sounds present  NERVOUS SYSTEM:  Alert & Oriented X3, Good concentration; Motor Strength 5/5 B/L   EXTREMITIES:  2+ Peripheral Pulses, No clubbing, cyanosis, or edema  SKIN;    LABS:                        14.6   5.4   )-----------( 210      ( 19 Sep 2018 07:57 )             42.2         139  |  107  |  13  ----------------------------<  107<H>  4.2   |  25  |  0.90    Ca    9.0      19 Sep 2018 07:57  Phos  3.0       Mg     2.3         TPro  7.7  /  Alb  3.9  /  TBili  0.8  /  DBili  x   /  AST  29  /  ALT  56  /  AlkPhos  88        Urinalysis Basic - ( 18 Sep 2018 02:17 )    Color: Yellow / Appearance: Clear / S.010 / pH: x  Gluc: x / Ketone: Negative  / Bili: Negative / Urobili: Negative   Blood: x / Protein: Negative / Nitrite: Negative   Leuk Esterase: Negative / RBC: x / WBC x   Sq Epi: x / Non Sq Epi: x / Bacteria: x      CAPILLARY BLOOD GLUCOSE          RADIOLOGY & ADDITIONAL TESTS: Patient is a 43y old  Male who presents with a chief complaint of generalized weakness and lightheadedness for last 2 weeks (18 Sep 2018 15:49)      INTERVAL HPI/OVERNIGHT EVENTS: no new complaints    MEDICATIONS  (STANDING):  aspirin enteric coated 81 milliGRAM(s) Oral daily  atorvastatin 20 milliGRAM(s) Oral at bedtime  carvedilol 6.25 milliGRAM(s) Oral every 12 hours  ergocalciferol 52240 Unit(s) Oral <User Schedule>  pantoprazole    Tablet 40 milliGRAM(s) Oral before breakfast  sacubitril 49 mG/valsartan 51 mG 1 Tablet(s) Oral two times a day  ticagrelor 90 milliGRAM(s) Oral two times a day    MEDICATIONS  (PRN):      Allergies    No Known Allergies    Intolerances        REVIEW OF SYSTEMS:  CONSTITUTIONAL: No fever, weight loss, or fatigue  RESPIRATORY: No cough, wheezing, chills or hemoptysis; No shortness of breath  CARDIOVASCULAR: No chest pain, palpitations, dizziness, or leg swelling  GASTROINTESTINAL: No abdominal or epigastric pain. No nausea, vomiting, or hematemesis; No diarrhea or constipation. No melena or hematochezia.  NEUROLOGICAL: No headaches, memory loss, loss of strength, numbness, or tremors  SKIN: No itching, burning, rashes, or lesions     Vital Signs Last 24 Hrs  T(C): 36.9 (19 Sep 2018 15:41), Max: 36.9 (19 Sep 2018 15:41)  T(F): 98.4 (19 Sep 2018 15:41), Max: 98.4 (19 Sep 2018 15:41)  HR: 57 (19 Sep 2018 15:41) (53 - 62)  BP: 117/66 (19 Sep 2018 15:41) (105/57 - 131/83)  BP(mean): --  RR: 18 (19 Sep 2018 15:41) (16 - 18)  SpO2: 98% (19 Sep 2018 15:41) (98% - 100%)    PHYSICAL EXAM:  GENERAL: NAD,  HEAD:  Atraumatic, Normocephalic  EYES: EOMI, PERRLA, conjunctiva and sclera clear  NECK: Supple, No JVD, Normal thyroid  CHEST/LUNG: Clear to percussion bilaterally; No rales, rhonchi, wheezing, or rubs  HEART: Regular rate and rhythm; No murmurs, rubs, or gallops  ABDOMEN: Soft, Nontender, Nondistended; Bowel sounds present  NERVOUS SYSTEM:  Alert & Oriented X3, Good concentration; Motor Strength 5/5 B/L   EXTREMITIES:  2+ Peripheral Pulses, No clubbing, cyanosis, or edema  SKIN;    LABS:                        14.6   5.4   )-----------( 210      ( 19 Sep 2018 07:57 )             42.2         139  |  107  |  13  ----------------------------<  107<H>  4.2   |  25  |  0.90    Ca    9.0      19 Sep 2018 07:57  Phos  3.0       Mg     2.3         TPro  7.7  /  Alb  3.9  /  TBili  0.8  /  DBili  x   /  AST  29  /  ALT  56  /  AlkPhos  88        Urinalysis Basic - ( 18 Sep 2018 02:17 )    Color: Yellow / Appearance: Clear / S.010 / pH: x  Gluc: x / Ketone: Negative  / Bili: Negative / Urobili: Negative   Blood: x / Protein: Negative / Nitrite: Negative   Leuk Esterase: Negative / RBC: x / WBC x   Sq Epi: x / Non Sq Epi: x / Bacteria: x      CAPILLARY BLOOD GLUCOSE          RADIOLOGY & ADDITIONAL TESTS:      < from: Transthoracic Echocardiogram (18 @ 09:48) >  Ejection Fraction Visual Estimate: 30-35 %    < from: Transthoracic Echocardiogram (18 @ 09:48) >  CONCLUSIONS:  1. An annuloplasty ring seen in mitral position. Mild  mitral regurgitation.  2. Bioprosthetic aortic valve replacement.  3. Aortic Root: 4.2 cm.  4. Mild left atrial enlargement.  5. Normal left ventricular internal dimensions and wall  thicknesses.  6. Endocardium not well visualized; grossly severely  reduced  left ventricular systolic function.  7. Grade III diastolic dysfunction.  8. Normal right atrium.  9. Normal right ventricular size and function.  10. Unable to estimate RVSP.  11. There is mild tricuspid regurgitation.  12. There is mild pulmonic regurgitation.  13. Normal pericardium with no pericardial effusion.      < end of copied text >    < end of copied text >

## 2018-09-19 NOTE — PROGRESS NOTE ADULT - PROBLEM SELECTOR PLAN 1
- likely from low EF   -ECHO noted as above   -orthostatic negative though BP is borderline  - will restart home medications with parameters  - will be transferred to [place] for cardiac cath , if no CAD then AICD

## 2018-09-20 ENCOUNTER — INPATIENT (INPATIENT)
Facility: HOSPITAL | Age: 43
LOS: 0 days | Discharge: HOME | DRG: 286 | End: 2018-09-21
Attending: GENERAL PRACTICE | Admitting: INTERNAL MEDICINE
Payer: MEDICAID

## 2018-09-20 VITALS
HEART RATE: 68 BPM | DIASTOLIC BLOOD PRESSURE: 76 MMHG | RESPIRATION RATE: 18 BRPM | TEMPERATURE: 98 F | SYSTOLIC BLOOD PRESSURE: 126 MMHG | OXYGEN SATURATION: 99 %

## 2018-09-20 VITALS
WEIGHT: 299.83 LBS | TEMPERATURE: 98 F | DIASTOLIC BLOOD PRESSURE: 77 MMHG | OXYGEN SATURATION: 99 % | HEIGHT: 69 IN | SYSTOLIC BLOOD PRESSURE: 116 MMHG | HEART RATE: 60 BPM | RESPIRATION RATE: 18 BRPM

## 2018-09-20 DIAGNOSIS — Z95.2 PRESENCE OF PROSTHETIC HEART VALVE: Chronic | ICD-10-CM

## 2018-09-20 DIAGNOSIS — I49.9 CARDIAC ARRHYTHMIA, UNSPECIFIED: ICD-10-CM

## 2018-09-20 DIAGNOSIS — Z95.5 PRESENCE OF CORONARY ANGIOPLASTY IMPLANT AND GRAFT: Chronic | ICD-10-CM

## 2018-09-20 PROBLEM — E78.5 HYPERLIPIDEMIA, UNSPECIFIED: Chronic | Status: ACTIVE | Noted: 2018-09-18

## 2018-09-20 PROBLEM — I10 ESSENTIAL (PRIMARY) HYPERTENSION: Chronic | Status: ACTIVE | Noted: 2018-09-18

## 2018-09-20 PROBLEM — I25.10 ATHEROSCLEROTIC HEART DISEASE OF NATIVE CORONARY ARTERY WITHOUT ANGINA PECTORIS: Chronic | Status: ACTIVE | Noted: 2018-09-18

## 2018-09-20 PROCEDURE — 93010 ELECTROCARDIOGRAM REPORT: CPT

## 2018-09-20 PROCEDURE — 93306 TTE W/DOPPLER COMPLETE: CPT | Mod: 26

## 2018-09-20 RX ORDER — PANTOPRAZOLE SODIUM 20 MG/1
40 TABLET, DELAYED RELEASE ORAL
Qty: 0 | Refills: 0 | Status: DISCONTINUED | OUTPATIENT
Start: 2018-09-20 | End: 2018-09-21

## 2018-09-20 RX ORDER — ASPIRIN/CALCIUM CARB/MAGNESIUM 324 MG
81 TABLET ORAL DAILY
Qty: 0 | Refills: 0 | Status: DISCONTINUED | OUTPATIENT
Start: 2018-09-20 | End: 2018-09-21

## 2018-09-20 RX ORDER — ERGOCALCIFEROL 1.25 MG/1
50000 CAPSULE ORAL
Qty: 0 | Refills: 0 | Status: DISCONTINUED | OUTPATIENT
Start: 2018-09-20 | End: 2018-09-21

## 2018-09-20 RX ORDER — CARVEDILOL PHOSPHATE 80 MG/1
6.25 CAPSULE, EXTENDED RELEASE ORAL EVERY 12 HOURS
Qty: 0 | Refills: 0 | Status: DISCONTINUED | OUTPATIENT
Start: 2018-09-20 | End: 2018-09-21

## 2018-09-20 RX ORDER — SACUBITRIL AND VALSARTAN 24; 26 MG/1; MG/1
1 TABLET, FILM COATED ORAL
Qty: 0 | Refills: 0 | Status: DISCONTINUED | OUTPATIENT
Start: 2018-09-20 | End: 2018-09-21

## 2018-09-20 RX ORDER — ATORVASTATIN CALCIUM 80 MG/1
20 TABLET, FILM COATED ORAL AT BEDTIME
Qty: 0 | Refills: 0 | Status: DISCONTINUED | OUTPATIENT
Start: 2018-09-20 | End: 2018-09-21

## 2018-09-20 RX ORDER — TICAGRELOR 90 MG/1
90 TABLET ORAL
Qty: 0 | Refills: 0 | Status: DISCONTINUED | OUTPATIENT
Start: 2018-09-20 | End: 2018-09-21

## 2018-09-20 RX ADMIN — TICAGRELOR 90 MILLIGRAM(S): 90 TABLET ORAL at 17:19

## 2018-09-20 RX ADMIN — CARVEDILOL PHOSPHATE 6.25 MILLIGRAM(S): 80 CAPSULE, EXTENDED RELEASE ORAL at 20:41

## 2018-09-20 RX ADMIN — SACUBITRIL AND VALSARTAN 1 TABLET(S): 24; 26 TABLET, FILM COATED ORAL at 17:19

## 2018-09-20 RX ADMIN — ATORVASTATIN CALCIUM 20 MILLIGRAM(S): 80 TABLET, FILM COATED ORAL at 20:39

## 2018-09-20 NOTE — PROGRESS NOTE ADULT - SUBJECTIVE AND OBJECTIVE BOX
CHIEF COMPLAINT:Patient is a 43y old  Male who presents with a chief complaint of generalized weakness and lightheadedness for last 2 weeks.    	  REVIEW OF SYSTEMS:  CONSTITUTIONAL: No fever, weight loss, or fatigue  EYES: No eye pain, visual disturbances, or discharge  ENT:  No difficulty hearing, tinnitus, vertigo; No sinus or throat pain  NECK: No pain or stiffness  RESPIRATORY: No cough, wheezing, chills or hemoptysis; No Shortness of Breath  CARDIOVASCULAR: No chest pain, palpitations, passing out, dizziness, or leg swelling  GASTROINTESTINAL: No abdominal or epigastric pain. No nausea, vomiting, or hematemesis; No diarrhea or constipation. No melena or hematochezia.  GENITOURINARY: No dysuria, frequency, hematuria, or incontinence  NEUROLOGICAL: No headaches, memory loss, loss of strength, numbness, or tremors  SKIN: No itching, burning, rashes, or lesions   LYMPH Nodes: No enlarged glands  ENDOCRINE: No heat or cold intolerance; No hair loss  MUSCULOSKELETAL: No joint pain or swelling; No muscle, back, or extremity pain  PSYCHIATRIC: No depression, anxiety, mood swings, or difficulty sleeping  HEME/LYMPH: No easy bruising, or bleeding gums  ALLERGY AND IMMUNOLOGIC: No hives or eczema	      PHYSICAL EXAM:  T(C): 36.7 (09-20-18 @ 07:05), Max: 37.2 (09-19-18 @ 19:38)  HR: 68 (09-20-18 @ 07:05) (53 - 71)  BP: 126/76 (09-20-18 @ 07:05) (108/79 - 131/83)  RR: 18 (09-20-18 @ 07:05) (16 - 18)  SpO2: 99% (09-20-18 @ 07:05) (98% - 99%)  Wt(kg): --  I&O's Summary    19 Sep 2018 07:01  -  20 Sep 2018 07:00  --------------------------------------------------------  IN: 400 mL / OUT: 0 mL / NET: 400 mL        Appearance: Normal	  HEENT:   Normal oral mucosa, PERRL, EOMI	  Lymphatic: No lymphadenopathy  Cardiovascular: Normal S1 S2, No JVD, No murmurs, No edema  Respiratory: Lungs clear to auscultation	  Psychiatry: A & O x 3, Mood & affect appropriate  Gastrointestinal:  Soft, Non-tender, + BS	  Skin: No rashes, No ecchymoses, No cyanosis	  Neurologic: Non-focal  Extremities: Normal range of motion, No clubbing, cyanosis or edema  Vascular: Peripheral pulses palpable 2+ bilaterally    MEDICATIONS  (STANDING):  aspirin enteric coated 81 milliGRAM(s) Oral daily  atorvastatin 20 milliGRAM(s) Oral at bedtime  carvedilol 6.25 milliGRAM(s) Oral every 12 hours  ergocalciferol 44390 Unit(s) Oral <User Schedule>  pantoprazole    Tablet 40 milliGRAM(s) Oral before breakfast  sacubitril 49 mG/valsartan 51 mG 1 Tablet(s) Oral two times a day  ticagrelor 90 milliGRAM(s) Oral two times a day      TELEMETRY: 	  nsr,pvc's,coupletes    	  LABS:	 	    CARDIAC MARKERS:  CARDIAC MARKERS ( 18 Sep 2018 18:10 )  <0.015 ng/mL / x     / 127 U/L / x     / 1.3 ng/mL  CARDIAC MARKERS ( 18 Sep 2018 10:12 )  <0.015 ng/mL / x     / 139 U/L / x     / 1.2 ng/mL                                14.6   5.4   )-----------( 210      ( 19 Sep 2018 07:57 )             42.2     09-19    139  |  107  |  13  ----------------------------<  107<H>  4.2   |  25  |  0.90    Ca    9.0      19 Sep 2018 07:57  Phos  3.0     09-18  Mg     2.3     09-18        Lipid Profile: Cholesterol 116  LDL 62  HDL 41  TG 64    HgA1c: Hemoglobin A1C, Whole Blood: 6.2 % (09-18 @ 13:43)    TSH: Thyroid Stimulating Hormone, Serum: 0.80 uU/mL (09-18 @ 10:12)

## 2018-09-20 NOTE — H&P CARDIOLOGY - ATTENDING COMMENTS
pt seen and examined by me, transferred from [facility] by me for eval as above  discussed with cardio, will follow   EP to follow

## 2018-09-20 NOTE — PROGRESS NOTE ADULT - ASSESSMENT
43 yr old Male with PMHx of CAD-s/p PTCA LAD 2016, Rheumatic fever-S/P MV repair, S/P Bio AVR, CHF, Obesity who presents to ED with generalized weakness,nsvt.  1.Tele monitoring.  2.CAD and CHF-cont cardiac medication.  3.Cardiac cath and subsequent AICD eval if no CAD.  4.GI and DVT prophylaxis.
covering for Dr Arnold   pt was brought for severe weakness, has severe systolic dysf, mitral valve , avr, rhumatic fever by history    pt seen and examined  vs stable afebrile  no complaints.  heart murmur, lungs clear  no pedal edema.  going for cardiac cath and possible AICD  wt reduction/obesity
43M with PMHx of CAD-s/p PTCA LAD 2016, Rheumatic fever-S/P MV repair, S/P Bio AVR, CHF, Obesity who presents to ED with generalized weakness for last 2 weeks after he started diet.

## 2018-09-20 NOTE — H&P CARDIOLOGY - PSH
Aortic valve replaced    H/O heart artery stent  DESx2 LAD in 2016  H/O mitral valve replacement    History of open heart surgery

## 2018-09-20 NOTE — PROGRESS NOTE ADULT - REASON FOR ADMISSION
generalized weakness and lightheadedness for last 2 weeks

## 2018-09-20 NOTE — H&P CARDIOLOGY - HISTORY OF PRESENT ILLNESS
43 years old  male with PMHx of CAD-s/p DESx2 LAD 2016 (in [facility], by Dr Dumont), Rheumatic fever-S/P MV repair, S/P Bio AVR, CHF on Entresto, Obesity who presented to [facility] ED with generalized weakness for last 2 weeks after he started diet.  Since starting diet and exercise (walked 2 miles daily) he was having generalized weakness. For last 3-4 days he started having severe headache and dizziness right after taking his medications and noted to be hypotensive, around 80s/50s so he came to hospital for further management. Pt denies chest discomfort, palpitations. MACIEL, nausea, vomiting or recent infections.  Pt s/p negative troponin x3.  Pt evaluated by cardiology and transferred to [facility] for LHC and EP evaluation for AICD evaluation. 43 years old  male with PMHx of CAD-s/p DESx2 LAD 2016 (in [facility], by Dr Dumont), Rheumatic fever-S/P MV repair, S/P Bio AVR, CHF on Entresto, Obesity, who presented to [facility] ED with generalized weakness for last 2 weeks after he started diet.    Since starting diet and exercise (walked 2 miles daily) he was having generalized weakness. For last 3-4 days he started having severe headache and dizziness right after taking his medications and noted to be hypotensive, around 80s/50s so he came to hospital for further management.   Pt denies chest discomfort, palpitations. MACIEL, nausea, vomiting or recent infections.  Pt s/p negative troponin x3.  Pt evaluated by cardiology and transferred to [facility] for LHC and EP evaluation for AICD evaluation, as pt had NSVT, and other arrythmias

## 2018-09-21 ENCOUNTER — TRANSCRIPTION ENCOUNTER (OUTPATIENT)
Age: 43
End: 2018-09-21

## 2018-09-21 VITALS
RESPIRATION RATE: 18 BRPM | HEART RATE: 59 BPM | SYSTOLIC BLOOD PRESSURE: 117 MMHG | DIASTOLIC BLOOD PRESSURE: 79 MMHG | TEMPERATURE: 99 F | OXYGEN SATURATION: 100 %

## 2018-09-21 LAB
ANION GAP SERPL CALC-SCNC: 12 MMOL/L — SIGNIFICANT CHANGE UP (ref 5–17)
BUN SERPL-MCNC: 13 MG/DL — SIGNIFICANT CHANGE UP (ref 7–23)
CALCIUM SERPL-MCNC: 9.9 MG/DL — SIGNIFICANT CHANGE UP (ref 8.4–10.5)
CHLORIDE SERPL-SCNC: 100 MMOL/L — SIGNIFICANT CHANGE UP (ref 96–108)
CO2 SERPL-SCNC: 22 MMOL/L — SIGNIFICANT CHANGE UP (ref 22–31)
CREAT SERPL-MCNC: 1.09 MG/DL — SIGNIFICANT CHANGE UP (ref 0.5–1.3)
GLUCOSE SERPL-MCNC: 100 MG/DL — HIGH (ref 70–99)
HCT VFR BLD CALC: 41.8 % — SIGNIFICANT CHANGE UP (ref 39–50)
HGB BLD-MCNC: 15.4 G/DL — SIGNIFICANT CHANGE UP (ref 13–17)
MAGNESIUM SERPL-MCNC: 2 MG/DL — SIGNIFICANT CHANGE UP (ref 1.6–2.6)
MCHC RBC-ENTMCNC: 31.7 PG — SIGNIFICANT CHANGE UP (ref 27–34)
MCHC RBC-ENTMCNC: 36.9 GM/DL — HIGH (ref 32–36)
MCV RBC AUTO: 86 FL — SIGNIFICANT CHANGE UP (ref 80–100)
PLATELET # BLD AUTO: 216 K/UL — SIGNIFICANT CHANGE UP (ref 150–400)
POTASSIUM SERPL-MCNC: 3.8 MMOL/L — SIGNIFICANT CHANGE UP (ref 3.5–5.3)
POTASSIUM SERPL-SCNC: 3.8 MMOL/L — SIGNIFICANT CHANGE UP (ref 3.5–5.3)
RBC # BLD: 4.86 M/UL — SIGNIFICANT CHANGE UP (ref 4.2–5.8)
RBC # FLD: 12.6 % — SIGNIFICANT CHANGE UP (ref 10.3–14.5)
SODIUM SERPL-SCNC: 134 MMOL/L — LOW (ref 135–145)
WBC # BLD: 5.9 K/UL — SIGNIFICANT CHANGE UP (ref 3.8–10.5)
WBC # FLD AUTO: 5.9 K/UL — SIGNIFICANT CHANGE UP (ref 3.8–10.5)

## 2018-09-21 RX ORDER — ERGOCALCIFEROL 1.25 MG/1
1 CAPSULE ORAL
Qty: 4 | Refills: 0
Start: 2018-09-21 | End: 2018-10-20

## 2018-09-21 RX ADMIN — TICAGRELOR 90 MILLIGRAM(S): 90 TABLET ORAL at 13:59

## 2018-09-21 RX ADMIN — Medication 81 MILLIGRAM(S): at 13:58

## 2018-09-21 RX ADMIN — SACUBITRIL AND VALSARTAN 1 TABLET(S): 24; 26 TABLET, FILM COATED ORAL at 13:59

## 2018-09-21 RX ADMIN — PANTOPRAZOLE SODIUM 40 MILLIGRAM(S): 20 TABLET, DELAYED RELEASE ORAL at 13:59

## 2018-09-21 NOTE — DISCHARGE NOTE ADULT - PLAN OF CARE
You will not have ventricular tachycardia Continue your beta blocker and all your other medications.  Follow up with Dr. Canada on Monday.  If you experience recurrent weakness, dizziness, syncope, palpitations, chest pain, or shortness of breath, please seek immediate medical attention. You will remain chest pain free and understand post cath discharge instructions Low salt, low fat diet.   Weight management.   Take medications as prescribed.    No smoking.  Follow up appointments with your doctor(s)  as instructed.  Do not stop your Asprin or Brilinta unless instructed to do so by your cardiologist. Your LDL cholesterol will be less than 70mg/dL Continue with your cholesterol medications. Eat a heart healthy diet that is low in saturated fats and salt, and includes whole grains, fruits, vegetables and lean protein; exercise regularly (consult with your physician or cardiologist first); maintain a heart healthy weight; if you smoke - quit (A resource to help you stop smoking is the [facility] Tobacco [facility] – phone number [phone].). Continue to follow with your primary physician or cardiologist. Your blood pressure will be controlled. Continue with your blood pressure medications; eat a heart healthy diet with low salt diet; exercise regularly (consult with your physician or cardiologist first); maintain a heart healthy weight; if you smoke - quit (A resource to help you stop smoking is the [facility] for Tobacco Control – phone number [phone].); include healthy ways to manage stress. Continue to follow with your primary care physician or cardiologist.

## 2018-09-21 NOTE — DISCHARGE NOTE ADULT - ADDITIONAL INSTRUCTIONS
Follow up with Dr. Canada on Monday.  No heavy lifting, strenuous activity, bending, straining, or unnecessary stair climbing for 2 weeks. No driving for 2 days. You may shower 24 hours following the procedure but avoid baths/swimming for 1 week. Check your groin site for bleeding and/or swelling daily following procedure and call your doctor immediately if it occurs or if you experience increased pain at the site. Follow up with your cardiologist in 1-2 weeks. You may call [facility]  if you have any questions/concerns regarding your procedure [phone].

## 2018-09-21 NOTE — CHART NOTE - NSCHARTNOTEFT_GEN_A_CORE
ECHO Images reviewed with Dr Snowden    Endocardium not well visualized despite the use of echo enhancing agent Definity,  Severe global LV dysfunction EF 30%  RV not well visualized, grossly normal RV size and function    Graham Goldstein MD, PhD  Cardiology Attending  [facility]/ [facility]  [phone]    (Cardiology Nocturnist cell number available 7 pm - 7 am every night; available daytime week days for follow-up only; daytime weekends covered by general cardiology consult service)

## 2018-09-21 NOTE — DISCHARGE NOTE ADULT - MEDICATION SUMMARY - MEDICATIONS TO TAKE
I will START or STAY ON the medications listed below when I get home from the hospital:    Ecotrin Adult Low Strength 81 mg oral delayed release tablet  -- 1 tab(s) by mouth once a day MDD:1  home and hospital  -- Swallow whole.  Do not crush.  Take with food or milk.    -- Indication: For stents    sacubitril-valsartan 49 mg-51 mg oral tablet  -- 1 tab(s) by mouth 2 times a day    home and hospital  -- Indication: For Heart failure    Lipitor 20 mg oral tablet  -- 1 tab(s) by mouth once a day (at bedtime)    home and hospital  -- Indication: For Heart disease, cholesterol    ticagrelor 90 mg oral tablet  -- 1 tab(s) by mouth 2 times a day MDD:1    home and hospital  -- Indication: For stents    carvedilol 6.25 mg oral tablet  -- 1 tab(s) by mouth every 12 hours    home and hospital  -- Indication: For NSVT, heart failure    pantoprazole 40 mg oral delayed release tablet  -- 1 tab(s) by mouth once a day (before a meal) MDD:1    home and hospital  -- Indication: For GERD    ergocalciferol 50,000 intl units (1.25 mg) oral capsule  -- 1 cap(s) by mouth once a week    hospital  -- Indication: For vitamin D deficiency

## 2018-09-21 NOTE — CONSULT NOTE ADULT - SUBJECTIVE AND OBJECTIVE BOX
Patient is a 43y old  Male who presents with a chief complaint of transferred for AICD (21 Sep 2018 07:51)      HPI:  43 years old  male with PMHx of CAD-s/p DESx2 LAD 2016 (in [facility], by Dr Dumont), Rheumatic fever-S/P MV repair, S/P Bio AVR, CHF on Entresto, Obesity, who presented to [facility] ED with generalized weakness for last 2 weeks after he started diet.    Since starting diet and exercise (walked 2 miles daily) he was having generalized weakness. For last 3-4 days he started having severe headache and dizziness right after taking his medications and noted to be hypotensive, around 80s/50s so he came to hospital for further management.   Pt denies chest discomfort, palpitations. MACIEL, nausea, vomiting or recent infections.  Pt s/p negative troponin x3.  Pt evaluated by cardiology and transferred to [facility] for LHC and EP evaluation for AICD evaluation, as pt had NSVT, and other arrythmias (20 Sep 2018 11:20)           *****PAST MEDICAL / Surgical  HISTORY:  PAST MEDICAL & SURGICAL HISTORY:  Dyslipidemia  Hypertension  CAD (coronary artery disease)  Rheumatic fever  H/O heart artery stent: DESx2 LAD in 2016  H/O mitral valve replacement  Aortic valve replaced  History of open heart surgery           *****FAMILY HISTORY:  FAMILY HISTORY:  No pertinent family history in first degree relatives           *****SOCIAL HISTORY:  Alcohol: None  Smoking: None         *****ALLERGIES:   Allergies    No Known Allergies    Intolerances             *****MEDICATIONS:  MEDICATIONS  (STANDING):  aspirin enteric coated 81 milliGRAM(s) Oral daily  atorvastatin 20 milliGRAM(s) Oral at bedtime  carvedilol 6.25 milliGRAM(s) Oral every 12 hours  ergocalciferol 12089 Unit(s) Oral every week  pantoprazole    Tablet 40 milliGRAM(s) Oral before breakfast  sacubitril 49 mG/valsartan 51 mG 1 Tablet(s) Oral two times a day  ticagrelor 90 milliGRAM(s) Oral two times a day    MEDICATIONS  (PRN):           *****REVIEW OF SYSTEM:  GEN: no fever, no chills, no pain  RESP: no SOB, no cough, no sputum  CVS: no chest pain, no palpitations, no edema  GI: no abdominal pain, no nausea, no vomiting, no constipation, no diarrhea  : no dysurea, no frequency, no hematurea  Neuro: no headache, no dizziness  PSYCH: no anxiety, no depression  Derm : no itching, no rash         *****VITAL SIGNS:  T(C): 36.5 (09-21-18 @ 05:26), Max: 36.9 (09-20-18 @ 10:59)  HR: 58 (09-21-18 @ 05:26) (56 - 73)  BP: 107/75 (09-21-18 @ 05:26) (102/60 - 116/77)  RR: 16 (09-21-18 @ 05:26) (16 - 18)  SpO2: 99% (09-21-18 @ 05:26) (98% - 99%)  Wt(kg): --    09-20 @ 07:01  -  09-21 @ 07:00  --------------------------------------------------------  IN: 0 mL / OUT: 0 mL / NET: 0 mL    09-21 @ 07:01  -  09-21 @ 09:52  --------------------------------------------------------  IN: 0 mL / OUT: 0 mL / NET: 0 mL             *****PHYSICAL EXAM:  GEN: A&O X 3 , NAD , comfortable  HEENT: NCAT, PERRL, MMM, hearing intact  Neck: supple , no JVD  CVS: S1S2 , regular , No M/R/G appreciated  PULM: CTA B/L,  no W/R/R appreciated  ABD.: soft. non tender, non distended,  bowel sounds present  Extrem: intact pulses , no edema   Derm: No rash , no ecchymoses  PSYCH : normal mood,  no delusion not anxious         *****LAB AND IMAGING:                          15.4   5.9   )-----------( 216      ( 21 Sep 2018 00:27 )             41.8               09-21    134<L>  |  100  |  13  ----------------------------<  100<H>  3.8   |  22  |  1.09    Ca    9.9      21 Sep 2018 00:27  Mg     2.0     09-21                                  [All pertinent recent Imaging reports reviewed]         *****A S S E S S M E N T   A N D   P L A N :  43M with CAD-s/p PCI of LAD 2016, MV repair, AVR(T) , CHF, Obesity who presented with generalized weakness.  had nsvt at [facility]  cont tele monitoring.  cont cardiac medication.  cardiac cath with minor CAD  EP eval noted  ?EPS, ?ICD      ___________________________  Will follow with you.  Thank you,  TABATHA Dumont D.O.

## 2018-09-21 NOTE — DISCHARGE NOTE ADULT - CARE PLAN
Principal Discharge DX:	NSVT (nonsustained ventricular tachycardia)  Goal:	You will not have ventricular tachycardia  Assessment and plan of treatment:	Continue your beta blocker and all your other medications.  Follow up with Dr. Canada on Monday.  If you experience recurrent weakness, dizziness, syncope, palpitations, chest pain, or shortness of breath, please seek immediate medical attention.  Secondary Diagnosis:	Coronary artery disease involving native coronary artery of native heart, angina presence unspecified  Goal:	You will remain chest pain free and understand post cath discharge instructions  Assessment and plan of treatment:	Low salt, low fat diet.   Weight management.   Take medications as prescribed.    No smoking.  Follow up appointments with your doctor(s)  as instructed.  Do not stop your Asprin or Brilinta unless instructed to do so by your cardiologist.  Secondary Diagnosis:	Dyslipidemia  Goal:	Your LDL cholesterol will be less than 70mg/dL  Assessment and plan of treatment:	Continue with your cholesterol medications. Eat a heart healthy diet that is low in saturated fats and salt, and includes whole grains, fruits, vegetables and lean protein; exercise regularly (consult with your physician or cardiologist first); maintain a heart healthy weight; if you smoke - quit (A resource to help you stop smoking is the [facility] for Tobacco Control – phone number [phone].). Continue to follow with your primary physician or cardiologist.  Secondary Diagnosis:	Essential hypertension  Goal:	Your blood pressure will be controlled.  Assessment and plan of treatment:	Continue with your blood pressure medications; eat a heart healthy diet with low salt diet; exercise regularly (consult with your physician or cardiologist first); maintain a heart healthy weight; if you smoke - quit (A resource to help you stop smoking is the [facility] for Tobacco Control – phone number [phone].); include healthy ways to manage stress. Continue to follow with your primary care physician or cardiologist.

## 2018-09-21 NOTE — DISCHARGE NOTE ADULT - SECONDARY DIAGNOSIS.
Coronary artery disease involving native coronary artery of native heart, angina presence unspecified Dyslipidemia Essential hypertension

## 2018-09-21 NOTE — CONSULT NOTE ADULT - ASSESSMENT
pt with known hx of cardiomyopathy last ef 2 years ago 20 %, pt with echo ef of 30 % on appropriate medical therapy transsfered for AICD implant.  pt had a echo this am read as ef of 40-45 %   plan will review echo with lab if ef >35 will need ep study in view of wct to risk stratify.

## 2018-09-21 NOTE — CONSULT NOTE ADULT - SUBJECTIVE AND OBJECTIVE BOX
E L E C T R O  P H Y S I O L O G Y     CONSULTATION NOTE   ________________________________________________      CHIEF COMPLAINT:Patient is a 43y old  Male who presents with a chief complaint of nsvt.    HPI:  43 years old  male with PMHx of CAD-s/p DESx2 LAD 2016 (in [facility], by Dr Dumont), Rheumatic fever-S/P MV repair, S/P Bio AVR, CHF on Entresto, Obesity, who presented to [facility] ED with generalized weakness for last 2 weeks after he started diet.    Since starting diet and exercise (walked 2 miles daily) he was having generalized weakness. For last 3-4 days he started having severe headache and dizziness right after taking his medications and noted to be hypotensive, around 80s/50s so he came to hospital for further management.   Pt denies chest discomfort, palpitations. MACIEL, nausea, vomiting or recent infections.  Pt s/p negative troponin x3.  Pt evaluated by cardiology and transferred to [facility] for LHC and EP evaluation for AICD evaluation, as pt had NSVT, and other arrythmias (20 Sep 2018 11:20)      PAST MEDICAL & SURGICAL HISTORY:  Dyslipidemia  Hypertension  CAD (coronary artery disease)  Rheumatic fever  H/O heart artery stent: DESx2 LAD in 2016  H/O mitral valve replacement  Aortic valve replaced  History of open heart surgery      MEDICATIONS  (STANDING):  aspirin enteric coated 81 milliGRAM(s) Oral daily  atorvastatin 20 milliGRAM(s) Oral at bedtime  carvedilol 6.25 milliGRAM(s) Oral every 12 hours  ergocalciferol 36170 Unit(s) Oral every week  pantoprazole    Tablet 40 milliGRAM(s) Oral before breakfast  sacubitril 49 mG/valsartan 51 mG 1 Tablet(s) Oral two times a day  ticagrelor 90 milliGRAM(s) Oral two times a day    MEDICATIONS  (PRN):      FAMILY HISTORY:  No pertinent family history in first degree relatives      SOCIAL HISTORY:    [ ] Non-smoker  [ ] Smoker  [ ] Alcohol    Allergies    No Known Allergies    Intolerances    	    REVIEW OF SYSTEMS:  CONSTITUTIONAL: No fever, weight loss, or fatigue  EYES: No eye pain, visual disturbances, or discharge  ENT:  No difficulty hearing, tinnitus, vertigo; No sinus or throat pain  NECK: No pain or stiffness  RESPIRATORY: No cough, wheezing, chills or hemoptysis; No Shortness of Breath  CARDIOVASCULAR: No chest pain, palpitations, passing out, dizziness, or leg swelling  GASTROINTESTINAL: No abdominal or epigastric pain. No nausea, vomiting, or hematemesis; No diarrhea or constipation. No melena or hematochezia.  GENITOURINARY: No dysuria, frequency, hematuria, or incontinence  NEUROLOGICAL: No headaches, memory loss, loss of strength, numbness, or tremors  SKIN: No itching, burning, rashes, or lesions   LYMPH Nodes: No enlarged glands  ENDOCRINE: No heat or cold intolerance; No hair loss  MUSCULOSKELETAL: No joint pain or swelling; No muscle, back, or extremity pain  PSYCHIATRIC: No depression, anxiety, mood swings, or difficulty sleeping  HEME/LYMPH: No easy bruising, or bleeding gums  ALLERGY AND IMMUNOLOGIC: No hives or eczema	    [ ] All others negative	  [ ] Unable to obtain    PHYSICAL EXAM:  T(C): 36.5 (09-21-18 @ 05:26), Max: 36.9 (09-20-18 @ 10:59)  HR: 58 (09-21-18 @ 05:26) (56 - 73)  BP: 107/75 (09-21-18 @ 05:26) (102/60 - 116/77)  RR: 16 (09-21-18 @ 05:26) (16 - 18)  SpO2: 99% (09-21-18 @ 05:26) (98% - 99%)  Wt(kg): --  I&O's Summary    20 Sep 2018 07:01  -  21 Sep 2018 07:00  --------------------------------------------------------  IN: 0 mL / OUT: 0 mL / NET: 0 mL        Appearance: Normal	  HEENT:   Normal oral mucosa, PERRL, EOMI	  Lymphatic: No lymphadenopathy  Cardiovascular: Normal S1 S2, No JVD, No murmurs, No edema  Respiratory: Lungs clear to auscultation	  Psychiatry: A & O x 3, Mood & affect appropriate  Gastrointestinal:  Soft, Non-tender, + BS	  Skin: No rashes, No ecchymoses, No cyanosis	  Neurologic: Non-focal  Extremities: Normal range of motion, No clubbing, cyanosis or edema  Vascular: Peripheral pulses palpable 2+ bilaterally    TELEMETRY: 	    ECG:  	  RADIOLOGY:  OTHER: 	  	  LABS:	 	    CARDIAC MARKERS:                              15.4   5.9   )-----------( 216      ( 21 Sep 2018 00:27 )             41.8     09-21    134<L>  |  100  |  13  ----------------------------<  100<H>  3.8   |  22  |  1.09    Ca    9.9      21 Sep 2018 00:27  Mg     2.0     09-21      proBNP:   Lipid Profile:   HgA1c:   TSH:     PREVIOUS DIAGNOSTIC TESTING:    [ ] Echocardiogram:  [ ]  Catheterization:  [ ] Stress Test:

## 2018-09-21 NOTE — DISCHARGE NOTE ADULT - PATIENT PORTAL LINK FT
You can access the [facility] Patient Portal, offered by [facility], by registering with the following website: [email or web address]

## 2018-09-21 NOTE — CHART NOTE - NSCHARTNOTEFT_GEN_A_CORE
echo reviewed by echocardiograph dr MARTIN ef 30%  pt needs AICD base on his cardiomyopathy and NSVT.  had a long discussion with pt several times ,risk of sudden cardiac death explained to the prt and he understands.  pt wants to go home.

## 2018-09-21 NOTE — DISCHARGE NOTE ADULT - CARE PROVIDER_API CALL
Rohan Canada (DO), Cardiology Medicine  [address] [place] [address]  Phone: [phone]  Fax: [phone]

## 2018-12-26 PROCEDURE — 85027 COMPLETE CBC AUTOMATED: CPT

## 2018-12-26 PROCEDURE — 84484 ASSAY OF TROPONIN QUANT: CPT

## 2018-12-26 PROCEDURE — 81003 URINALYSIS AUTO W/O SCOPE: CPT

## 2018-12-26 PROCEDURE — 82550 ASSAY OF CK (CPK): CPT

## 2018-12-26 PROCEDURE — 84443 ASSAY THYROID STIM HORMONE: CPT

## 2018-12-26 PROCEDURE — 82607 VITAMIN B-12: CPT

## 2018-12-26 PROCEDURE — C8929: CPT

## 2018-12-26 PROCEDURE — 82306 VITAMIN D 25 HYDROXY: CPT

## 2018-12-26 PROCEDURE — 84100 ASSAY OF PHOSPHORUS: CPT

## 2018-12-26 PROCEDURE — 93005 ELECTROCARDIOGRAM TRACING: CPT

## 2018-12-26 PROCEDURE — 80053 COMPREHEN METABOLIC PANEL: CPT

## 2018-12-26 PROCEDURE — 80061 LIPID PANEL: CPT

## 2018-12-26 PROCEDURE — 99152 MOD SED SAME PHYS/QHP 5/>YRS: CPT

## 2018-12-26 PROCEDURE — 80048 BASIC METABOLIC PNL TOTAL CA: CPT

## 2018-12-26 PROCEDURE — 83735 ASSAY OF MAGNESIUM: CPT

## 2018-12-26 PROCEDURE — C1769: CPT

## 2018-12-26 PROCEDURE — 82553 CREATINE MB FRACTION: CPT

## 2018-12-26 PROCEDURE — C1887: CPT

## 2018-12-26 PROCEDURE — 99153 MOD SED SAME PHYS/QHP EA: CPT

## 2018-12-26 PROCEDURE — 71045 X-RAY EXAM CHEST 1 VIEW: CPT

## 2018-12-26 PROCEDURE — 83036 HEMOGLOBIN GLYCOSYLATED A1C: CPT

## 2018-12-26 PROCEDURE — 93454 CORONARY ARTERY ANGIO S&I: CPT

## 2018-12-26 PROCEDURE — C1894: CPT

## 2018-12-26 PROCEDURE — 82746 ASSAY OF FOLIC ACID SERUM: CPT

## 2018-12-26 PROCEDURE — 99285 EMERGENCY DEPT VISIT HI MDM: CPT | Mod: 25

## 2019-05-28 NOTE — PRE-OP CHECKLIST - STERILIZATION AFFIRMATION
Dermal Autograft Text: The defect edges were debeveled with a #15 scalpel blade.  Given the location of the defect, shape of the defect and the proximity to free margins a dermal autograft was deemed most appropriate.  Using a sterile surgical marker, the primary defect shape was transferred to the donor site. The area thus outlined was incised deep to adipose tissue with a #15 scalpel blade.  The harvested graft was then trimmed of adipose and epidermal tissue until only dermis was left.  The skin graft was then placed in the primary defect and oriented appropriately. n/a

## 2022-01-23 NOTE — DISCHARGE NOTE ADULT - NSTOBACCOREFERRAL_GEN_A_CS
General Adult HPI





- General


Chief complaint: ENT


Stated complaint: Ear Pain


Time Seen by Provider: 01/23/22 15:29


Source: patient


Mode of arrival: ambulatory


Limitations: no limitations





- History of Present Illness


Initial comments: 





25-year-old female without any significant past medical history presents to the 

emergency room for a chief complaint of not feeling well.  Patient states that 

she has bilateral ear pain and pressure.  States her right ear feels worse.  She

is congested.  She also has a cough.  Denies fevers.  States she was exposed to 

COVID-19.Patient has no other complaints at this time including shortness of 

breath, chest pain, abdominal pain, nausea or vomiting, headache, or visual 

changes.





- Related Data


                                  Previous Rx's











 Medication  Instructions  Recorded


 


Amoxicillin 875 mg PO Q12HR #20 tablet 01/23/22


 


Fluticasone Nasal Spray [Flonase 1 spray EA NOSTRIL DAILY 7 Days 01/23/22





Nasal Spray] #16 gm 











                                    Allergies











Allergy/AdvReac Type Severity Reaction Status Date / Time


 


No Known Allergies Allergy   Verified 01/23/22 15:21














Review of Systems


ROS Statement: 


Those systems with pertinent positive or pertinent negative responses have been 

documented in the HPI.





ROS Other: All systems not noted in ROS Statement are negative.





Past Medical History


Past Medical History: No Reported History


History of Any Multi-Drug Resistant Organisms: None Reported


Past Surgical History: Cholecystectomy


Past Psychological History: Anxiety, Depression, PTSD


Smoking Status: Current every day smoker


Past Alcohol Use History: None Reported


Past Drug Use History: None Reported





General Exam


Limitations: no limitations


General appearance: alert, in no apparent distress


Head exam: Present: atraumatic


Eye exam: Present: normal appearance, PERRL, EOMI.  Absent: scleral icterus, 

conjunctival injection


ENT exam: Present: normal exam, mucous membranes moist.  Absent: TM's normal 

bilaterally (Right tympanic membrane erythematous and bulging.  Left tympanic 

membrane is normal.)


Neck exam: Present: normal inspection, full ROM.  Absent: tenderness


Respiratory exam: Present: normal lung sounds bilaterally.  Absent: respiratory 

distress, wheezes


Cardiovascular Exam: Present: regular rate, normal rhythm, normal heart sounds


GI/Abdominal exam: Present: soft, normal bowel sounds.  Absent: distended, 

tenderness





Course


                                   Vital Signs











  01/23/22





  15:22


 


Temperature 98.5 F


 


Pulse Rate 88


 


Respiratory 20





Rate 


 


Blood Pressure 145/85


 


O2 Sat by Pulse 100





Oximetry 














Medical Decision Making





- Medical Decision Making





Vitals are stable.  Patient is well-appearing.  Patient does have a right otitis

media.  Patient did test positive for COVID-19 chest x-ray shows a normal chest.

 At this time patient will be treated with amoxicillin for a right otitis media.

 I did offer antibody infusion to patient given she doesn't qualify as she is 

not vaccinated and her PMI is aware.  However she declines at this time.  States

that she has to be getting home.  Patient will follow up with her doctor and 

return here for any worsening symptoms.





- Lab Data


                                   Lab Results











  01/23/22 Range/Units





  15:36 


 


Coronavirus (PCR)  Detected A  (Not Detectd)  














Disposition


Clinical Impression: 


 Otitis media, COVID-19





Disposition: HOME SELF-CARE


Condition: Good


Instructions (If sedation given, give patient instructions):  Coronavirus 

Disease 2019 (COVID-19), Ear Infection (ED)


Additional Instructions: 


Please follow-up with your doctor in one to 2 days.  Take vitamin C, D, and zinc

over-the-counter.  Take Motrin and Tylenol for fever.  Return to the emergency 

room for any worsening symptoms.


Prescriptions: 


Amoxicillin 875 mg PO Q12HR #20 tablet


Fluticasone Nasal Spray [Flonase Nasal Spray] 1 spray EA NOSTRIL DAILY 7 Days 

#16 gm


Is patient prescribed a controlled substance at d/c from ED?: No


Referrals: 


Tawnya Grewal MD [STAFF PHYSICIAN] - 1-2 days


Time of Disposition: 16:19 Patient declined information

## 2023-05-31 NOTE — PATIENT PROFILE ADULT. - NSNUTRITIONRISK_GI_A_CORE
Well controlled  Blood pressure today is 126/72   Continue lisinopril 20 mg daily No indicators present

## 2023-10-12 NOTE — H&P CARDIOLOGY - SOURCE
How Severe Are Your Spot(S)?: mild
Hpi Title: Evaluation of Skin Lesions
Additional History: Lesion of the right leg is new, noticed it a few days ago. Lesion of the neck has been present for years.
Patient

## 2024-03-11 ENCOUNTER — INPATIENT (INPATIENT)
Facility: HOSPITAL | Age: 49
LOS: 7 days | Discharge: HOME | DRG: 286 | End: 2024-03-19
Attending: INTERNAL MEDICINE | Admitting: INTERNAL MEDICINE
Payer: MEDICAID

## 2024-03-11 VITALS
RESPIRATION RATE: 18 BRPM | WEIGHT: 259.93 LBS | SYSTOLIC BLOOD PRESSURE: 156 MMHG | HEIGHT: 69 IN | DIASTOLIC BLOOD PRESSURE: 93 MMHG | HEART RATE: 73 BPM | OXYGEN SATURATION: 97 % | TEMPERATURE: 98 F

## 2024-03-11 DIAGNOSIS — M79.89 OTHER SPECIFIED SOFT TISSUE DISORDERS: ICD-10-CM

## 2024-03-11 DIAGNOSIS — Z95.5 PRESENCE OF CORONARY ANGIOPLASTY IMPLANT AND GRAFT: Chronic | ICD-10-CM

## 2024-03-11 DIAGNOSIS — Z95.2 PRESENCE OF PROSTHETIC HEART VALVE: Chronic | ICD-10-CM

## 2024-03-11 LAB
ALBUMIN SERPL ELPH-MCNC: 3.9 G/DL — SIGNIFICANT CHANGE UP (ref 3.3–5)
ALP SERPL-CCNC: 197 U/L — HIGH (ref 40–120)
ALT FLD-CCNC: 30 U/L — SIGNIFICANT CHANGE UP (ref 10–45)
ANION GAP SERPL CALC-SCNC: 10 MMOL/L — SIGNIFICANT CHANGE UP (ref 5–17)
AST SERPL-CCNC: 30 U/L — SIGNIFICANT CHANGE UP (ref 10–40)
BASOPHILS # BLD AUTO: 0.04 K/UL — SIGNIFICANT CHANGE UP (ref 0–0.2)
BASOPHILS NFR BLD AUTO: 1 % — SIGNIFICANT CHANGE UP (ref 0–2)
BILIRUB SERPL-MCNC: 1.1 MG/DL — SIGNIFICANT CHANGE UP (ref 0.2–1.2)
BUN SERPL-MCNC: 21 MG/DL — SIGNIFICANT CHANGE UP (ref 7–23)
CALCIUM SERPL-MCNC: 9.3 MG/DL — SIGNIFICANT CHANGE UP (ref 8.4–10.5)
CHLORIDE SERPL-SCNC: 105 MMOL/L — SIGNIFICANT CHANGE UP (ref 96–108)
CO2 SERPL-SCNC: 24 MMOL/L — SIGNIFICANT CHANGE UP (ref 22–31)
CREAT SERPL-MCNC: 0.8 MG/DL — SIGNIFICANT CHANGE UP (ref 0.5–1.3)
EGFR: 110 ML/MIN/1.73M2 — SIGNIFICANT CHANGE UP
EOSINOPHIL # BLD AUTO: 0.1 K/UL — SIGNIFICANT CHANGE UP (ref 0–0.5)
EOSINOPHIL NFR BLD AUTO: 2.4 % — SIGNIFICANT CHANGE UP (ref 0–6)
GLUCOSE SERPL-MCNC: 122 MG/DL — HIGH (ref 70–99)
HCT VFR BLD CALC: 39.9 % — SIGNIFICANT CHANGE UP (ref 39–50)
HGB BLD-MCNC: 12.7 G/DL — LOW (ref 13–17)
IMM GRANULOCYTES NFR BLD AUTO: 0.2 % — SIGNIFICANT CHANGE UP (ref 0–0.9)
LYMPHOCYTES # BLD AUTO: 1.86 K/UL — SIGNIFICANT CHANGE UP (ref 1–3.3)
LYMPHOCYTES # BLD AUTO: 44.8 % — HIGH (ref 13–44)
MCHC RBC-ENTMCNC: 29.1 PG — SIGNIFICANT CHANGE UP (ref 27–34)
MCHC RBC-ENTMCNC: 31.8 GM/DL — LOW (ref 32–36)
MCV RBC AUTO: 91.5 FL — SIGNIFICANT CHANGE UP (ref 80–100)
MONOCYTES # BLD AUTO: 0.55 K/UL — SIGNIFICANT CHANGE UP (ref 0–0.9)
MONOCYTES NFR BLD AUTO: 13.3 % — SIGNIFICANT CHANGE UP (ref 2–14)
NEUTROPHILS # BLD AUTO: 1.59 K/UL — LOW (ref 1.8–7.4)
NEUTROPHILS NFR BLD AUTO: 38.3 % — LOW (ref 43–77)
NRBC # BLD: 0 /100 WBCS — SIGNIFICANT CHANGE UP (ref 0–0)
NT-PROBNP SERPL-SCNC: 1532 PG/ML — HIGH (ref 0–300)
PLATELET # BLD AUTO: 195 K/UL — SIGNIFICANT CHANGE UP (ref 150–400)
POTASSIUM SERPL-MCNC: 4 MMOL/L — SIGNIFICANT CHANGE UP (ref 3.5–5.3)
POTASSIUM SERPL-SCNC: 4 MMOL/L — SIGNIFICANT CHANGE UP (ref 3.5–5.3)
PROT SERPL-MCNC: 6.9 G/DL — SIGNIFICANT CHANGE UP (ref 6–8.3)
RBC # BLD: 4.36 M/UL — SIGNIFICANT CHANGE UP (ref 4.2–5.8)
RBC # FLD: 13.2 % — SIGNIFICANT CHANGE UP (ref 10.3–14.5)
SODIUM SERPL-SCNC: 139 MMOL/L — SIGNIFICANT CHANGE UP (ref 135–145)
TROPONIN T, HIGH SENSITIVITY RESULT: 28 NG/L — SIGNIFICANT CHANGE UP (ref 0–51)
WBC # BLD: 4.15 K/UL — SIGNIFICANT CHANGE UP (ref 3.8–10.5)
WBC # FLD AUTO: 4.15 K/UL — SIGNIFICANT CHANGE UP (ref 3.8–10.5)

## 2024-03-11 PROCEDURE — 93970 EXTREMITY STUDY: CPT | Mod: 26

## 2024-03-11 PROCEDURE — 99285 EMERGENCY DEPT VISIT HI MDM: CPT

## 2024-03-11 PROCEDURE — 71045 X-RAY EXAM CHEST 1 VIEW: CPT | Mod: 26

## 2024-03-11 RX ORDER — SACUBITRIL AND VALSARTAN 24; 26 MG/1; MG/1
1 TABLET, FILM COATED ORAL
Refills: 0 | Status: DISCONTINUED | OUTPATIENT
Start: 2024-03-11 | End: 2024-03-19

## 2024-03-11 RX ORDER — ATORVASTATIN CALCIUM 80 MG/1
20 TABLET, FILM COATED ORAL AT BEDTIME
Refills: 0 | Status: DISCONTINUED | OUTPATIENT
Start: 2024-03-11 | End: 2024-03-19

## 2024-03-11 RX ORDER — FUROSEMIDE 40 MG
40 TABLET ORAL ONCE
Refills: 0 | Status: COMPLETED | OUTPATIENT
Start: 2024-03-11 | End: 2024-03-11

## 2024-03-11 RX ORDER — PANTOPRAZOLE SODIUM 20 MG/1
40 TABLET, DELAYED RELEASE ORAL
Refills: 0 | Status: DISCONTINUED | OUTPATIENT
Start: 2024-03-11 | End: 2024-03-19

## 2024-03-11 RX ORDER — ASPIRIN/CALCIUM CARB/MAGNESIUM 324 MG
81 TABLET ORAL DAILY
Refills: 0 | Status: DISCONTINUED | OUTPATIENT
Start: 2024-03-11 | End: 2024-03-19

## 2024-03-11 RX ORDER — CARVEDILOL PHOSPHATE 80 MG/1
6.25 CAPSULE, EXTENDED RELEASE ORAL EVERY 12 HOURS
Refills: 0 | Status: DISCONTINUED | OUTPATIENT
Start: 2024-03-11 | End: 2024-03-19

## 2024-03-11 RX ADMIN — Medication 40 MILLIGRAM(S): at 23:49

## 2024-03-11 NOTE — ED PROVIDER NOTE - CLINICAL SUMMARY MEDICAL DECISION MAKING FREE TEXT BOX
48yr M hx of chf, cad s/p stent, obesity on only entresto w poor f/u p/w increased weight, and feeling of low ext swelling, denies cp, sob, and reports beign able to walk at work constantly withou MACIEL. denies prior VTE hx. denies infectious sx.   exam notable for well appearing, no acute distress, clear lungs s1-2, holosystolic murmur, soft abd however skin over abd appears taught, low ext pitting edema left more than rt.   concern for worsening chf due to medication non compliance vs DVT. will do us, labs, and likely admit.

## 2024-03-11 NOTE — H&P ADULT - HISTORY OF PRESENT ILLNESS
Date of Service, 03-11-24 @ 23:31  CHIEF COMPLAINT:Patient is a 48y old  Male who presents with a chief complaint of     HPI:   49 y/o male, with PMHx of CAD-s/p DESx2 LAD 2016 (in [facility], by Dr Dumont), Rheumatic fever-S/P MV repair, S/P Bio AVR, CHF, presents to the ER with complaint of b/l leg swelling L>R. states has been present for a year. states swelling comes and goes. states recently noticed swelling present for longer period of time and not resolving. denies f/n/v/d, CP, SOB, HA, dizziness, abdominal pain, urinary symptoms, numbness, tingling, fall.    PAST MEDICAL & SURGICAL HISTORY:  Rheumatic fever      CAD (coronary artery disease)  Hypertension  Dyslipidemia    History of open heart surgery  Aortic valve replaced  H/O mitral valve replacement  H/O heart artery stent  DESx2 LAD in 2016      MEDICATIONS  (STANDING):  noted  MEDICATIONS  (PRN):      FAMILY HISTORY:  No pertinent family history in first degree relatives        SOCIAL HISTORY:    x[ ] Non-smoker  [ ] Smoker  [ ] Alcohol    Allergies    No Known Allergies    Intolerances    	    REVIEW OF SYSTEMS:  CONSTITUTIONAL: No fever, weight loss, or fatigue  EYES: No eye pain, visual disturbances, or discharge  ENT:  No difficulty hearing, tinnitus, vertigo; No sinus or throat pain  NECK: No pain or stiffness  RESPIRATORY: No cough, wheezing, chills or hemoptysis; + Shortness of Breath  CARDIOVASCULAR: No chest pain, palpitations, passing out, dizziness, or leg swelling  GASTROINTESTINAL: No abdominal or epigastric pain. No nausea, vomiting, or hematemesis; No diarrhea or constipation. No melena or hematochezia.  GENITOURINARY: No dysuria, frequency, hematuria, or incontinence  NEUROLOGICAL: No headaches, memory loss, loss of strength, numbness, or tremors  SKIN: No itching, burning, rashes, or lesions   LYMPH Nodes: No enlarged glands  ENDOCRINE: No heat or cold intolerance; No hair loss  MUSCULOSKELETAL: No joint pain or swelling; No muscle, back, + edema  PSYCHIATRIC: No depression, anxiety, mood swings, or difficulty sleeping  HEME/LYMPH: No easy bruising, or bleeding gums  ALLERGY AND IMMUNOLOGIC: No hives or eczema	    [x ] All others negative	  [ ] Unable to obtain    PHYSICAL EXAM:  T(C): 36.9 (03-11-24 @ 21:25), Max: 36.9 (03-11-24 @ 21:25)  HR: 73 (03-11-24 @ 21:25) (73 - 73)  BP: 156/93 (03-11-24 @ 21:25) (156/93 - 156/93)  RR: 18 (03-11-24 @ 21:25) (18 - 18)  SpO2: 97% (03-11-24 @ 21:25) (97% - 97%)  Wt(kg): --  I&O's Summary      Appearance: Normal	  HEENT:   Normal oral mucosa, PERRL, EOMI	  Lymphatic: No lymphadenopathy  Cardiovascular: Normal S1 S2, No JVD, + murmurs, + edema L>R  Respiratory: Lungs clear to auscultation	  Psychiatry: A & O x 3, Mood & affect appropriate  Gastrointestinal:  Soft, Non-tender, + BS	  Skin: No rashes, No ecchymoses, No cyanosis	  Neurologic: Non-focal  Extremities: Normal range of motion, No clubbing, cyanosis + edema  Vascular: Peripheral pulses palpable 2+ bilaterally    TELEMETRY: 	    ECG:  	  RADIOLOGY:  OTHER: 	  	  LABS:	 	    CARDIAC MARKERS:                proBNP:   Lipid Profile:   HgA1c:   TSH:       PREVIOUS DIAGNOSTIC TESTING:

## 2024-03-11 NOTE — ED PROVIDER NOTE - OBJECTIVE STATEMENT
47 y/o male, with PMHx of CAD-s/p DESx2 LAD 2016 (in [facility], by Dr Dumont), Rheumatic fever-S/P MV repair, S/P Bio AVR, CHF, presents to the ER with complaint of b/l leg swelling L>R. states has been present for a year. states swelling comes and goes. states recently noticed swelling present for longer period of time and not resolving. denies f/n/v/d, CP, SOB, HA, dizziness, abdominal pain, urinary symptoms, numbness, tingling, fall.

## 2024-03-11 NOTE — H&P ADULT - ASSESSMENT
47 y/o male, with PMHx of CAD-s/p DESx2 LAD 2016 (in [facility], by Dr Dumont), Rheumatic fever-S/P MV repair, S/P Bio AVR, CHF, presents to the ER with complaint of b/l leg swelling L>R. states has been present for a year. states swelling comes and goes. states recently noticed swelling present for longer period of time and not resolving. denies f/n/v/d, CP, SOB, HA, dizziness, abdominal pain, urinary symptoms, numbness, tingling, fall.  chf systolic acute on chronic  / le edema with hx of ischemic cardiomyopathy  tele  TTE  continue all cardiac meds, non compliant to meds  awaiting doppler results r/o dvt  follow up renal function, awaiting labs  tsh

## 2024-03-11 NOTE — H&P ADULT - NSICDXPASTSURGICALHX_GEN_ALL_CORE_FT
PAST SURGICAL HISTORY:  Aortic valve replaced     H/O heart artery stent DESx2 LAD in 2016    H/O mitral valve replacement     History of open heart surgery

## 2024-03-11 NOTE — ED ADULT NURSE NOTE - OBJECTIVE STATEMENT
48YM A&OX4 PMHX of CAD s/p YOLANDA (x2) LAD 2016 Rheumatic Fever s/p MV repair, CHF presents to the ED c/o b/l leg swelling L>R w/ erythema for 2 weeks. pt denies SOB, CP, f/c/n/v/d, abdominal pain

## 2024-03-11 NOTE — ED PROVIDER NOTE - PROGRESS NOTE DETAILS
Brayan Gillis PA-C: spoke with Dr. Canada recommend admission to his service. discussed with ED attending Brayan Gillis PA-C: spoke with Dr. Canada recommend admission to his service. requesting patient receive 40mg of IV Lasix. discussed with ED attending

## 2024-03-11 NOTE — H&P ADULT - NSHPLABSRESULTS_GEN_ALL_CORE
< from: TTE with Doppler (w/Cont) (09.20.18 @ 18:57) >    1. An annuloplasty ring is seen in the mitral position..  Mild mitral regurgitation.Peak mitral valve gradient  equals 10 mm Hg, mean transmitral valve gradient equals 4  mm Hg, which is probably normal in the setting of an  annuloplasty ring as is seen in the mitral position.  2. Bioprosthetic aortic valve. Peak transaortic valve  gradient equals 10 mm Hg, mean transaortic valve gradient  equals 7 mm Hg, which is probably normal in the presence of  a bioprosthetic aortic valve.  3. Normal left ventricular internal dimensions and wall  thicknesses.  4. Moderate left ventricular systolic dysfunction. The  distal septum and distal inferior segments are dyskinetic,  which may be consistent with prior cardiac surgery.  Endocardial visualization enhanced with intravenous  injection of Ultrasonic Enhancing Agent (Definity).  5. The right ventricle is not well visualized; grossly  normal right ventricular systolic function.  6. Pulmonic valve not well visualized, appears thickened.  Pulmonic valve peak velocity 3.4 m/s; PG 46 mmHg; MG 28mmHg  consistent with moderate pulmonic stenosis.  Mild-moderate  pulmonic regurgitation. Consider CHAD to better evaluate  pulmonic valve if clinically indicated.

## 2024-03-11 NOTE — ED PROVIDER NOTE - PHYSICAL EXAMINATION
swelling present to b/l LE L>R. DP/PT pulses intact b/l. no ttp of LE, no erythema present, no warmth present. normal ROM of b/l LE.

## 2024-03-11 NOTE — H&P ADULT - NSICDXPASTMEDICALHX_GEN_ALL_CORE_FT
PAST MEDICAL HISTORY:  CAD (coronary artery disease)     Dyslipidemia     Hypertension     Rheumatic fever

## 2024-03-12 ENCOUNTER — RESULT REVIEW (OUTPATIENT)
Age: 49
End: 2024-03-12

## 2024-03-12 PROCEDURE — 93306 TTE W/DOPPLER COMPLETE: CPT | Mod: 26

## 2024-03-12 RX ORDER — FUROSEMIDE 40 MG
20 TABLET ORAL
Refills: 0 | Status: DISCONTINUED | OUTPATIENT
Start: 2024-03-12 | End: 2024-03-18

## 2024-03-12 RX ADMIN — PANTOPRAZOLE SODIUM 40 MILLIGRAM(S): 20 TABLET, DELAYED RELEASE ORAL at 05:30

## 2024-03-12 RX ADMIN — SACUBITRIL AND VALSARTAN 1 TABLET(S): 24; 26 TABLET, FILM COATED ORAL at 05:30

## 2024-03-12 RX ADMIN — CARVEDILOL PHOSPHATE 6.25 MILLIGRAM(S): 80 CAPSULE, EXTENDED RELEASE ORAL at 05:30

## 2024-03-12 RX ADMIN — ATORVASTATIN CALCIUM 20 MILLIGRAM(S): 80 TABLET, FILM COATED ORAL at 21:03

## 2024-03-12 RX ADMIN — Medication 81 MILLIGRAM(S): at 14:15

## 2024-03-12 RX ADMIN — Medication 20 MILLIGRAM(S): at 14:17

## 2024-03-12 RX ADMIN — CARVEDILOL PHOSPHATE 6.25 MILLIGRAM(S): 80 CAPSULE, EXTENDED RELEASE ORAL at 17:51

## 2024-03-12 RX ADMIN — SACUBITRIL AND VALSARTAN 1 TABLET(S): 24; 26 TABLET, FILM COATED ORAL at 17:51

## 2024-03-12 NOTE — PATIENT PROFILE ADULT - HOW PATIENT ADDRESSED, PROFILE
Patient continues to present with feeding and swallowing difficulties in a  infant with cystic BPD and prolonged non oral feeding period. Patient demonstrating interest in PO, however, poor coordination of suck, swallow, breathe (SSB) pattern despite maximum supports on part of feeder. Current performance does not support safe oral diet initiation at this time. Recommend pacifier dips to facilitate oromotor and pharyngeal responses with SLP to further assess tolerance of therapeutic oral feed during next session. Patient continues to present with feeding and swallowing difficulties in a  infant with cystic BPD and prolonged non oral feeding period. Patient demonstrating interest in PO, however, poor coordination of suck, swallow, breathe (SSB) pattern despite maximum supports on part of feeder. Current performance does not support safe oral diet initiation at this time. Recommend pacifier dips to facilitate oromotor and pharyngeal responses with SLP to further assess. Case

## 2024-03-13 LAB
ANION GAP SERPL CALC-SCNC: 12 MMOL/L — SIGNIFICANT CHANGE UP (ref 5–17)
BUN SERPL-MCNC: 17 MG/DL — SIGNIFICANT CHANGE UP (ref 7–23)
CALCIUM SERPL-MCNC: 9.3 MG/DL — SIGNIFICANT CHANGE UP (ref 8.4–10.5)
CHLORIDE SERPL-SCNC: 104 MMOL/L — SIGNIFICANT CHANGE UP (ref 96–108)
CO2 SERPL-SCNC: 23 MMOL/L — SIGNIFICANT CHANGE UP (ref 22–31)
CREAT SERPL-MCNC: 0.79 MG/DL — SIGNIFICANT CHANGE UP (ref 0.5–1.3)
EGFR: 110 ML/MIN/1.73M2 — SIGNIFICANT CHANGE UP
GLUCOSE SERPL-MCNC: 100 MG/DL — HIGH (ref 70–99)
POTASSIUM SERPL-MCNC: 3.7 MMOL/L — SIGNIFICANT CHANGE UP (ref 3.5–5.3)
POTASSIUM SERPL-SCNC: 3.7 MMOL/L — SIGNIFICANT CHANGE UP (ref 3.5–5.3)
SODIUM SERPL-SCNC: 139 MMOL/L — SIGNIFICANT CHANGE UP (ref 135–145)

## 2024-03-13 RX ORDER — SPIRONOLACTONE 25 MG/1
25 TABLET, FILM COATED ORAL DAILY
Refills: 0 | Status: DISCONTINUED | OUTPATIENT
Start: 2024-03-13 | End: 2024-03-19

## 2024-03-13 RX ORDER — ENOXAPARIN SODIUM 100 MG/ML
40 INJECTION SUBCUTANEOUS EVERY 24 HOURS
Refills: 0 | Status: DISCONTINUED | OUTPATIENT
Start: 2024-03-13 | End: 2024-03-19

## 2024-03-13 RX ADMIN — Medication 20 MILLIGRAM(S): at 13:35

## 2024-03-13 RX ADMIN — CARVEDILOL PHOSPHATE 6.25 MILLIGRAM(S): 80 CAPSULE, EXTENDED RELEASE ORAL at 17:43

## 2024-03-13 RX ADMIN — SACUBITRIL AND VALSARTAN 1 TABLET(S): 24; 26 TABLET, FILM COATED ORAL at 17:43

## 2024-03-13 RX ADMIN — SPIRONOLACTONE 25 MILLIGRAM(S): 25 TABLET, FILM COATED ORAL at 06:16

## 2024-03-13 RX ADMIN — Medication 20 MILLIGRAM(S): at 05:01

## 2024-03-13 RX ADMIN — ATORVASTATIN CALCIUM 20 MILLIGRAM(S): 80 TABLET, FILM COATED ORAL at 21:49

## 2024-03-13 RX ADMIN — ENOXAPARIN SODIUM 40 MILLIGRAM(S): 100 INJECTION SUBCUTANEOUS at 06:02

## 2024-03-13 RX ADMIN — SACUBITRIL AND VALSARTAN 1 TABLET(S): 24; 26 TABLET, FILM COATED ORAL at 05:01

## 2024-03-13 RX ADMIN — PANTOPRAZOLE SODIUM 40 MILLIGRAM(S): 20 TABLET, DELAYED RELEASE ORAL at 05:00

## 2024-03-13 RX ADMIN — Medication 81 MILLIGRAM(S): at 12:15

## 2024-03-13 RX ADMIN — CARVEDILOL PHOSPHATE 6.25 MILLIGRAM(S): 80 CAPSULE, EXTENDED RELEASE ORAL at 05:00

## 2024-03-13 NOTE — PHARMACOTHERAPY INTERVENTION NOTE - COMMENTS
Medication reconciliation completed. Confirmed outpatient meds with patient and pharmacy.     Entresto 49-51 mg - take one tablet twice a day   Aspirin 81 mg - take one tablet daily     Chad Apodaca   Pharm D Candidate 2024  Medication reconciliation completed. Confirmed outpatient medications with patient and pharmacy, updated in Outpatient Medication Review.    Home medications:  Entresto 49-51 mg - Take one tablet twice a day   Aspirin 81 mg - Take one tablet daily     Chad Apodaca   Pharm D Candidate 2024     Renetta Huerta, PharmD, [facility]  Clinical Pharmacy Specialist  [phone] or Teams

## 2024-03-14 LAB
ANION GAP SERPL CALC-SCNC: 13 MMOL/L — SIGNIFICANT CHANGE UP (ref 5–17)
BUN SERPL-MCNC: 23 MG/DL — SIGNIFICANT CHANGE UP (ref 7–23)
CALCIUM SERPL-MCNC: 9.4 MG/DL — SIGNIFICANT CHANGE UP (ref 8.4–10.5)
CHLORIDE SERPL-SCNC: 105 MMOL/L — SIGNIFICANT CHANGE UP (ref 96–108)
CO2 SERPL-SCNC: 21 MMOL/L — LOW (ref 22–31)
CREAT SERPL-MCNC: 0.84 MG/DL — SIGNIFICANT CHANGE UP (ref 0.5–1.3)
EGFR: 108 ML/MIN/1.73M2 — SIGNIFICANT CHANGE UP
GLUCOSE SERPL-MCNC: 89 MG/DL — SIGNIFICANT CHANGE UP (ref 70–99)
HGB BLDA-MCNC: 14.6 G/DL — SIGNIFICANT CHANGE UP (ref 12.6–17.4)
HGB FLD-MCNC: 14.4 G/DL — SIGNIFICANT CHANGE UP (ref 12.6–17.4)
OXYHGB MFR BLDA: 94.3 % — SIGNIFICANT CHANGE UP (ref 90–95)
OXYHGB MFR BLDMV: 66.1 % — LOW (ref 90–95)
POTASSIUM SERPL-MCNC: 3.8 MMOL/L — SIGNIFICANT CHANGE UP (ref 3.5–5.3)
POTASSIUM SERPL-SCNC: 3.8 MMOL/L — SIGNIFICANT CHANGE UP (ref 3.5–5.3)
SAO2 % BLD: 67.4 % — SIGNIFICANT CHANGE UP (ref 60–90)
SAO2 % BLDA: 96 % — SIGNIFICANT CHANGE UP (ref 94–98)
SODIUM SERPL-SCNC: 139 MMOL/L — SIGNIFICANT CHANGE UP (ref 135–145)

## 2024-03-14 RX ADMIN — ENOXAPARIN SODIUM 40 MILLIGRAM(S): 100 INJECTION SUBCUTANEOUS at 06:05

## 2024-03-14 RX ADMIN — CARVEDILOL PHOSPHATE 6.25 MILLIGRAM(S): 80 CAPSULE, EXTENDED RELEASE ORAL at 17:20

## 2024-03-14 RX ADMIN — Medication 20 MILLIGRAM(S): at 06:04

## 2024-03-14 RX ADMIN — SACUBITRIL AND VALSARTAN 1 TABLET(S): 24; 26 TABLET, FILM COATED ORAL at 17:20

## 2024-03-14 RX ADMIN — CARVEDILOL PHOSPHATE 6.25 MILLIGRAM(S): 80 CAPSULE, EXTENDED RELEASE ORAL at 06:03

## 2024-03-14 RX ADMIN — SACUBITRIL AND VALSARTAN 1 TABLET(S): 24; 26 TABLET, FILM COATED ORAL at 06:04

## 2024-03-14 RX ADMIN — ATORVASTATIN CALCIUM 20 MILLIGRAM(S): 80 TABLET, FILM COATED ORAL at 21:39

## 2024-03-14 RX ADMIN — PANTOPRAZOLE SODIUM 40 MILLIGRAM(S): 20 TABLET, DELAYED RELEASE ORAL at 06:04

## 2024-03-14 RX ADMIN — SPIRONOLACTONE 25 MILLIGRAM(S): 25 TABLET, FILM COATED ORAL at 06:03

## 2024-03-15 LAB
ANION GAP SERPL CALC-SCNC: 12 MMOL/L — SIGNIFICANT CHANGE UP (ref 5–17)
BUN SERPL-MCNC: 23 MG/DL — SIGNIFICANT CHANGE UP (ref 7–23)
CALCIUM SERPL-MCNC: 9.6 MG/DL — SIGNIFICANT CHANGE UP (ref 8.4–10.5)
CHLORIDE SERPL-SCNC: 103 MMOL/L — SIGNIFICANT CHANGE UP (ref 96–108)
CO2 SERPL-SCNC: 24 MMOL/L — SIGNIFICANT CHANGE UP (ref 22–31)
CREAT SERPL-MCNC: 0.89 MG/DL — SIGNIFICANT CHANGE UP (ref 0.5–1.3)
EGFR: 106 ML/MIN/1.73M2 — SIGNIFICANT CHANGE UP
GLUCOSE SERPL-MCNC: 100 MG/DL — HIGH (ref 70–99)
POTASSIUM SERPL-MCNC: 3.7 MMOL/L — SIGNIFICANT CHANGE UP (ref 3.5–5.3)
POTASSIUM SERPL-SCNC: 3.7 MMOL/L — SIGNIFICANT CHANGE UP (ref 3.5–5.3)
SODIUM SERPL-SCNC: 139 MMOL/L — SIGNIFICANT CHANGE UP (ref 135–145)

## 2024-03-15 RX ORDER — POTASSIUM CHLORIDE 20 MEQ
40 PACKET (EA) ORAL ONCE
Refills: 0 | Status: COMPLETED | OUTPATIENT
Start: 2024-03-15 | End: 2024-03-15

## 2024-03-15 RX ADMIN — CARVEDILOL PHOSPHATE 6.25 MILLIGRAM(S): 80 CAPSULE, EXTENDED RELEASE ORAL at 06:12

## 2024-03-15 RX ADMIN — SPIRONOLACTONE 25 MILLIGRAM(S): 25 TABLET, FILM COATED ORAL at 06:12

## 2024-03-15 RX ADMIN — Medication 81 MILLIGRAM(S): at 12:08

## 2024-03-15 RX ADMIN — CARVEDILOL PHOSPHATE 6.25 MILLIGRAM(S): 80 CAPSULE, EXTENDED RELEASE ORAL at 17:33

## 2024-03-15 RX ADMIN — Medication 20 MILLIGRAM(S): at 14:24

## 2024-03-15 RX ADMIN — PANTOPRAZOLE SODIUM 40 MILLIGRAM(S): 20 TABLET, DELAYED RELEASE ORAL at 06:12

## 2024-03-15 RX ADMIN — ATORVASTATIN CALCIUM 20 MILLIGRAM(S): 80 TABLET, FILM COATED ORAL at 21:22

## 2024-03-15 RX ADMIN — SACUBITRIL AND VALSARTAN 1 TABLET(S): 24; 26 TABLET, FILM COATED ORAL at 17:33

## 2024-03-15 RX ADMIN — SACUBITRIL AND VALSARTAN 1 TABLET(S): 24; 26 TABLET, FILM COATED ORAL at 06:12

## 2024-03-15 RX ADMIN — Medication 20 MILLIGRAM(S): at 06:12

## 2024-03-15 RX ADMIN — Medication 40 MILLIEQUIVALENT(S): at 09:56

## 2024-03-15 RX ADMIN — ENOXAPARIN SODIUM 40 MILLIGRAM(S): 100 INJECTION SUBCUTANEOUS at 06:12

## 2024-03-16 LAB
ANION GAP SERPL CALC-SCNC: 12 MMOL/L — SIGNIFICANT CHANGE UP (ref 5–17)
BUN SERPL-MCNC: 23 MG/DL — SIGNIFICANT CHANGE UP (ref 7–23)
CALCIUM SERPL-MCNC: 10.4 MG/DL — SIGNIFICANT CHANGE UP (ref 8.4–10.5)
CHLORIDE SERPL-SCNC: 102 MMOL/L — SIGNIFICANT CHANGE UP (ref 96–108)
CO2 SERPL-SCNC: 26 MMOL/L — SIGNIFICANT CHANGE UP (ref 22–31)
CREAT SERPL-MCNC: 0.96 MG/DL — SIGNIFICANT CHANGE UP (ref 0.5–1.3)
EGFR: 98 ML/MIN/1.73M2 — SIGNIFICANT CHANGE UP
GLUCOSE SERPL-MCNC: 132 MG/DL — HIGH (ref 70–99)
MAGNESIUM SERPL-MCNC: 2.1 MG/DL — SIGNIFICANT CHANGE UP (ref 1.6–2.6)
POTASSIUM SERPL-MCNC: 4.4 MMOL/L — SIGNIFICANT CHANGE UP (ref 3.5–5.3)
POTASSIUM SERPL-SCNC: 4.4 MMOL/L — SIGNIFICANT CHANGE UP (ref 3.5–5.3)
SODIUM SERPL-SCNC: 140 MMOL/L — SIGNIFICANT CHANGE UP (ref 135–145)

## 2024-03-16 RX ORDER — POTASSIUM CHLORIDE 20 MEQ
40 PACKET (EA) ORAL ONCE
Refills: 0 | Status: DISCONTINUED | OUTPATIENT
Start: 2024-03-16 | End: 2024-03-16

## 2024-03-16 RX ADMIN — Medication 81 MILLIGRAM(S): at 11:27

## 2024-03-16 RX ADMIN — Medication 20 MILLIGRAM(S): at 13:34

## 2024-03-16 RX ADMIN — ATORVASTATIN CALCIUM 20 MILLIGRAM(S): 80 TABLET, FILM COATED ORAL at 21:25

## 2024-03-16 RX ADMIN — PANTOPRAZOLE SODIUM 40 MILLIGRAM(S): 20 TABLET, DELAYED RELEASE ORAL at 05:26

## 2024-03-16 RX ADMIN — SACUBITRIL AND VALSARTAN 1 TABLET(S): 24; 26 TABLET, FILM COATED ORAL at 17:27

## 2024-03-16 RX ADMIN — ENOXAPARIN SODIUM 40 MILLIGRAM(S): 100 INJECTION SUBCUTANEOUS at 05:29

## 2024-03-16 RX ADMIN — CARVEDILOL PHOSPHATE 6.25 MILLIGRAM(S): 80 CAPSULE, EXTENDED RELEASE ORAL at 17:27

## 2024-03-16 RX ADMIN — CARVEDILOL PHOSPHATE 6.25 MILLIGRAM(S): 80 CAPSULE, EXTENDED RELEASE ORAL at 05:27

## 2024-03-16 RX ADMIN — Medication 20 MILLIGRAM(S): at 05:27

## 2024-03-16 RX ADMIN — SACUBITRIL AND VALSARTAN 1 TABLET(S): 24; 26 TABLET, FILM COATED ORAL at 05:26

## 2024-03-16 RX ADMIN — SPIRONOLACTONE 25 MILLIGRAM(S): 25 TABLET, FILM COATED ORAL at 05:27

## 2024-03-17 LAB
ANION GAP SERPL CALC-SCNC: 13 MMOL/L — SIGNIFICANT CHANGE UP (ref 5–17)
BUN SERPL-MCNC: 30 MG/DL — HIGH (ref 7–23)
CALCIUM SERPL-MCNC: 10 MG/DL — SIGNIFICANT CHANGE UP (ref 8.4–10.5)
CHLORIDE SERPL-SCNC: 102 MMOL/L — SIGNIFICANT CHANGE UP (ref 96–108)
CO2 SERPL-SCNC: 23 MMOL/L — SIGNIFICANT CHANGE UP (ref 22–31)
CREAT SERPL-MCNC: 0.99 MG/DL — SIGNIFICANT CHANGE UP (ref 0.5–1.3)
EGFR: 95 ML/MIN/1.73M2 — SIGNIFICANT CHANGE UP
GLUCOSE SERPL-MCNC: 103 MG/DL — HIGH (ref 70–99)
MAGNESIUM SERPL-MCNC: 2.1 MG/DL — SIGNIFICANT CHANGE UP (ref 1.6–2.6)
POTASSIUM SERPL-MCNC: 4.2 MMOL/L — SIGNIFICANT CHANGE UP (ref 3.5–5.3)
POTASSIUM SERPL-SCNC: 4.2 MMOL/L — SIGNIFICANT CHANGE UP (ref 3.5–5.3)
SODIUM SERPL-SCNC: 138 MMOL/L — SIGNIFICANT CHANGE UP (ref 135–145)

## 2024-03-17 RX ADMIN — Medication 20 MILLIGRAM(S): at 14:31

## 2024-03-17 RX ADMIN — CARVEDILOL PHOSPHATE 6.25 MILLIGRAM(S): 80 CAPSULE, EXTENDED RELEASE ORAL at 17:17

## 2024-03-17 RX ADMIN — SPIRONOLACTONE 25 MILLIGRAM(S): 25 TABLET, FILM COATED ORAL at 06:29

## 2024-03-17 RX ADMIN — ATORVASTATIN CALCIUM 20 MILLIGRAM(S): 80 TABLET, FILM COATED ORAL at 21:14

## 2024-03-17 RX ADMIN — Medication 20 MILLIGRAM(S): at 06:30

## 2024-03-17 RX ADMIN — Medication 81 MILLIGRAM(S): at 12:16

## 2024-03-17 RX ADMIN — CARVEDILOL PHOSPHATE 6.25 MILLIGRAM(S): 80 CAPSULE, EXTENDED RELEASE ORAL at 06:29

## 2024-03-17 RX ADMIN — SACUBITRIL AND VALSARTAN 1 TABLET(S): 24; 26 TABLET, FILM COATED ORAL at 17:17

## 2024-03-17 RX ADMIN — PANTOPRAZOLE SODIUM 40 MILLIGRAM(S): 20 TABLET, DELAYED RELEASE ORAL at 06:29

## 2024-03-17 RX ADMIN — SACUBITRIL AND VALSARTAN 1 TABLET(S): 24; 26 TABLET, FILM COATED ORAL at 06:30

## 2024-03-18 DIAGNOSIS — Z86.79 PERSONAL HISTORY OF OTHER DISEASES OF THE CIRCULATORY SYSTEM: ICD-10-CM

## 2024-03-18 PROCEDURE — 71275 CT ANGIOGRAPHY CHEST: CPT | Mod: 26

## 2024-03-18 PROCEDURE — 74174 CTA ABD&PLVS W/CONTRAST: CPT | Mod: 26

## 2024-03-18 PROCEDURE — 99223 1ST HOSP IP/OBS HIGH 75: CPT

## 2024-03-18 PROCEDURE — 75574 CT ANGIO HRT W/3D IMAGE: CPT | Mod: 26

## 2024-03-18 RX ORDER — FUROSEMIDE 40 MG
40 TABLET ORAL DAILY
Refills: 0 | Status: DISCONTINUED | OUTPATIENT
Start: 2024-03-18 | End: 2024-03-19

## 2024-03-18 RX ADMIN — PANTOPRAZOLE SODIUM 40 MILLIGRAM(S): 20 TABLET, DELAYED RELEASE ORAL at 06:19

## 2024-03-18 RX ADMIN — SPIRONOLACTONE 25 MILLIGRAM(S): 25 TABLET, FILM COATED ORAL at 06:20

## 2024-03-18 RX ADMIN — SACUBITRIL AND VALSARTAN 1 TABLET(S): 24; 26 TABLET, FILM COATED ORAL at 06:20

## 2024-03-18 RX ADMIN — Medication 81 MILLIGRAM(S): at 11:26

## 2024-03-18 RX ADMIN — ATORVASTATIN CALCIUM 20 MILLIGRAM(S): 80 TABLET, FILM COATED ORAL at 21:35

## 2024-03-18 RX ADMIN — SACUBITRIL AND VALSARTAN 1 TABLET(S): 24; 26 TABLET, FILM COATED ORAL at 17:50

## 2024-03-18 RX ADMIN — Medication 20 MILLIGRAM(S): at 06:21

## 2024-03-18 RX ADMIN — CARVEDILOL PHOSPHATE 6.25 MILLIGRAM(S): 80 CAPSULE, EXTENDED RELEASE ORAL at 06:19

## 2024-03-18 RX ADMIN — CARVEDILOL PHOSPHATE 6.25 MILLIGRAM(S): 80 CAPSULE, EXTENDED RELEASE ORAL at 17:49

## 2024-03-18 NOTE — DIETITIAN INITIAL EVALUATION ADULT - ORAL INTAKE PTA/DIET HISTORY
Pt reports having a good appetite and PO intake PTA; consuming >75% of most meals. Follows regular diet; attempts to limit salt and concentrated sweets. Pt denies any known food allergies or intolerances. Pt denies any micronutrient supplementation at home. Denies any difficulty chewing/swallowing at this time.

## 2024-03-18 NOTE — DIETITIAN INITIAL EVALUATION ADULT - OTHER CALCULATIONS
Fluid needs deferred to team.  Estimated needs using IBW in setting of BMI >35 with consideration for CHF.

## 2024-03-18 NOTE — DIETITIAN INITIAL EVALUATION ADULT - PERTINENT MEDS FT
MEDICATIONS  (STANDING):  aspirin enteric coated 81 milliGRAM(s) Oral daily  atorvastatin 20 milliGRAM(s) Oral at bedtime  carvedilol 6.25 milliGRAM(s) Oral every 12 hours  enoxaparin Injectable 40 milliGRAM(s) SubCutaneous every 24 hours  furosemide    Tablet 40 milliGRAM(s) Oral daily  pantoprazole    Tablet 40 milliGRAM(s) Oral before breakfast  sacubitril 49 mG/valsartan 51 mG 1 Tablet(s) Oral two times a day  spironolactone 25 milliGRAM(s) Oral daily    MEDICATIONS  (PRN):

## 2024-03-18 NOTE — CONSULT NOTE ADULT - NS ATTEND AMEND GEN_ALL_CORE FT
Events that transpired leading to the patient's current hospitalization along with the patient's hospital course was gone over.  The patient's past medical history/surgical history/family history/social history was reviewed.  Agree with 14 point review of systems and physical examination as noted above.    Discussed with the patient findings on imaging studies and cardiac catheterization that demonstrates significant pulmonary valve regurgitation/stenosis.  Per the patient he had a Ross procedure at [facility] approximately 20 years ago.  He now presents with acute on chronic diastolic heart failure.    Discussed with the patient what is pulmonic valve regurgitation/stenosis.  The natural pathophysiology of left untreated was reviewed.  The associated signs and symptoms were discussed.    The patient is being assessed now by multidisciplinary heart valve team to determine potential treatment options.  Explained to the patient potential different treatment options along with their pros/cons and risk/benefits of medical therapy, percutaneous transcatheter pulmonary valve implantation and surgery.  As part of the patient's workup it is recommended that a heart CTA be performed.  Indications for the heart CTA were gone over.    Continue medical optimization as per primary cardiology team.  Continue   aspirin enteric coated 81 mg daily, atorvastatin 20 mg daily, carvedilol 6.25 mg every 12 hours, furosemide 40 mg daily, sacubitril 49 mG/valsartan 51 mG 1 Tablet(s) Oral two times a day and spironolactone 25 milliGRAM(s) Oral daily    Continue telemetry monitoring.    All questions and concerns of the patient were addressed.    Findings and plan were discussed with interventional cardiology/Dr. Hammond, cardiology/Dr. Canada and cardiac surgery/Dr. Brown.

## 2024-03-18 NOTE — DIETITIAN INITIAL EVALUATION ADULT - PERSON TAUGHT/METHOD
Heart failure education provided to the patient. Discussed heart failure nutrition therapy, sodium and fluid intake, importance of diet adherence, daily weights monitoring with the patient. Reinforced importance of weight gain parameters and importance of contacting MD’s about weight changes. Pt made aware RD remains available PRN./verbal instruction/written material/patient instructed

## 2024-03-18 NOTE — DIETITIAN INITIAL EVALUATION ADULT - OTHER INFO
- UBW: unsure; reports weight gain in setting of fluid retention.   - Dosing wt: 259 pounds (3/11)  - Wt hx per chart in pounds: 271.1 (3/13), 261.2 (3/15), 253.7 (3/16), 252.2 (3/17)  - Wt hx per [facility] HIE: 299 pounds (2018).   - RD to continue to monitor weight trends as able.   - Nutritionally Pertinent Meds in-house: atorvastatin.   - Endo: elevated BG noted.   - Cardio:  	- CHF  	- Diuresing with PO Lasix, PO Aldactone.

## 2024-03-18 NOTE — CONSULT NOTE ADULT - SUBJECTIVE AND OBJECTIVE BOX
Structural Heart Team    History of Present Illness:          03-15 @ 07:01  -  03-16 @ 07:00  --------------------------------------------------------  IN: 600 mL / OUT: 3150 mL / NET: -2550 mL    03-16 @ 07:01  -  03-16 @ 10:15  --------------------------------------------------------  IN: 0 mL / OUT: 1600 mL / NET: -1600 mL        PAST MEDICAL & SURGICAL HISTORY:  Rheumatic fever      CAD (coronary artery disease)      Hypertension      Dyslipidemia      History of open heart surgery      Aortic valve replaced      H/O mitral valve replacement      H/O heart artery stent  DESx2 LAD in 2016          No Known Allergies      aspirin enteric coated 81 milliGRAM(s) Oral daily  atorvastatin 20 milliGRAM(s) Oral at bedtime  carvedilol 6.25 milliGRAM(s) Oral every 12 hours  enoxaparin Injectable 40 milliGRAM(s) SubCutaneous every 24 hours  furosemide   Injectable 20 milliGRAM(s) IV Push two times a day  pantoprazole    Tablet 40 milliGRAM(s) Oral before breakfast  sacubitril 49 mG/valsartan 51 mG 1 Tablet(s) Oral two times a day  spironolactone 25 milliGRAM(s) Oral daily      Home Medications:  sacubitril-valsartan 49 mg-51 mg oral tablet: 1 tab(s) orally 2 times a day    home and hospital (13 Mar 2024 10:33)      Review of Symptoms:  14 point review of systems is negative except what is described above    Physical Examination:  T(C): 36.7 (03-16-24 @ 05:18), Max: 36.9 (03-15-24 @ 11:41)  HR: 64 (03-16-24 @ 08:10) (60 - 67)  BP: 124/79 (03-16-24 @ 05:18) (106/71 - 131/86)  RR: 18 (03-16-24 @ 05:18) (18 - 18)  SpO2: 98% (03-16-24 @ 05:18) (95% - 98%)  Wt(kg): --  Gen: A/Ox3, NAD  HEENT: No JVD, Neck Supple, Trachea midline, No masses  Pulmonary: CTAB,  No accessory muscle use for respiration  Cardiac: S1S2, RRR,    No Gallops/Rubs  ECG:  Gastrointestinal: Soft, NT/ND, + Bowel Sounds  Extremities:  Edema,  No joint pain or swelling +PMSx4  Vascular: 1+ pulses B/L, No Bruits  Neurological: Non Focal, = motor and sensory    Laboratory:   03-15    139  |  103  |  23  ----------------------------<  100<H>  3.7   |  24  |  0.89    Ca    9.6      15 Mar 2024 06:01     
HPI:  Date of Service, 03-11-24 @ 23:31  CHIEF COMPLAINT:Patient is a 48y old  Male who presents with a chief complaint of     HPI:   47 y/o male, with PMHx of CAD-s/p DESx2 LAD 2016 (in [facility], by Dr Dumont), Rheumatic fever-S/P MV repair, S/P Bio AVR, CHF, presents to the ER with complaint of b/l leg swelling L>R. states has been present for a year. states swelling comes and goes. states recently noticed swelling present for longer period of time and not resolving. denies f/n/v/d, CP, SOB, HA, dizziness, abdominal pain, urinary symptoms, numbness, tingling, fall.     (11 Mar 2024 23:30)    Mr. Ayers reports prior AVR/MV Repair at [facility] twenty years ago. He has followed closely outpt with Dr. Canada for primary cardiology care. He reports several weeks of increasing abdominal distention, decreased appetite, and LE edema L>R. He has had no dyspnea, angina, PND, or orthopnea. The Structural Team was consulted for evaluation of pulmonic regurgitation and stenosis as noted on echocardiogram.     PAST MEDICAL & SURGICAL HISTORY:  Rheumatic fever      CAD (coronary artery disease)      Hypertension      Dyslipidemia      History of open heart surgery      Aortic valve replaced      H/O mitral valve replacement      H/O heart artery stent  DESx2 LAD in 2016          REVIEW OF SYSTEMS:    CONSTITUTIONAL: No weakness, fevers or chills  EYES/ENT: No visual changes;  No vertigo or throat pain   NECK: No pain or stiffness  RESPIRATORY: No cough, wheezing, hemoptysis; No shortness of breath  CARDIOVASCULAR: No chest pain or palpitations, PND, orthopnea, (+) LE edema  GASTROINTESTINAL: No abdominal or epigastric pain. No nausea, vomiting, or hematemesis; No diarrhea or constipation. No melena or hematochezia. (+) abdominal distention, (+) decreased appetite  GENITOURINARY: No dysuria, frequency or hematuria  NEUROLOGICAL: No numbness or weakness  SKIN: No itching, rashes      MEDICATIONS  (STANDING):  aspirin enteric coated 81 milliGRAM(s) Oral daily  atorvastatin 20 milliGRAM(s) Oral at bedtime  carvedilol 6.25 milliGRAM(s) Oral every 12 hours  enoxaparin Injectable 40 milliGRAM(s) SubCutaneous every 24 hours  furosemide    Tablet 40 milliGRAM(s) Oral daily  pantoprazole    Tablet 40 milliGRAM(s) Oral before breakfast  sacubitril 49 mG/valsartan 51 mG 1 Tablet(s) Oral two times a day  spironolactone 25 milliGRAM(s) Oral daily    MEDICATIONS  (PRN):      Allergies    No Known Allergies    Intolerances        SOCIAL HISTORY: works in a restaurant as a     FAMILY HISTORY:  No pertinent family history in first degree relatives        Vital Signs Last 24 Hrs  T(C): 36.5 (18 Mar 2024 11:26), Max: 36.9 (17 Mar 2024 21:37)  T(F): 97.7 (18 Mar 2024 11:26), Max: 98.4 (17 Mar 2024 21:37)  HR: 54 (18 Mar 2024 11:26) (54 - 62)  BP: 103/70 (18 Mar 2024 11:26) (103/70 - 118/80)  BP(mean): --  RR: 18 (18 Mar 2024 11:26) (18 - 18)  SpO2: 96% (18 Mar 2024 11:26) (96% - 97%)    Parameters below as of 18 Mar 2024 11:26  Patient On (Oxygen Delivery Method): room air        Physical Exam  General: A/ox 3, No acute Distress  Neck: Supple, NO JVD  Cardiac: S1 S2, No Murmur  Pulmonary: CTAB, Breathing unlabored, No Rhonchi/Rales/Wheezing  Abdomen: Soft, Non -tender, +BS x 4 quads, mildly distended   Extremities: No Rashes, Edema resolved  Neuro: A/o x 3, No focal deficits    LABS:    03-17    138  |  102  |  30<H>  ----------------------------<  103<H>  4.2   |  23  |  0.99    Ca    10.0      17 Mar 2024 06:06  Mg     2.1     03-17        Urinalysis Basic - ( 17 Mar 2024 06:06 )    Color: x / Appearance: x / SG: x / pH: x  Gluc: 103 mg/dL / Ketone: x  / Bili: x / Urobili: x   Blood: x / Protein: x / Nitrite: x   Leuk Esterase: x / RBC: x / WBC x   Sq Epi: x / Non Sq Epi: x / Bacteria: x        RADIOLOGY & ADDITIONAL STUDIES:  < from: TTE W or WO Ultrasound Enhancing Agent (03.12.24 @ 12:40) >  TRANSTHORACIC ECHOCARDIOGRAM REPORT  ________________________________________________________________________________                                      _______       Pt. Name:       MASOOD AYERS   Study Date:    3/12/2024  MRN:            [id number]   YOB: 1975  Accession #:    [id number]    Age:           48 years  Account#:       [id number] Gender:        M  Heart Rate:     65 bpm       Height:        69.00 in (175.26 cm)  Rhythm:                      Weight:        260.00 lb(117.94 kg)  Blood Pressure: 113/60 mmHg  BSA/BMI:       2.31 m² / 38.40 kg/m²  ________________________________________________________________________________________  Referring Physician:    [id number] Beverly Canada  Interpreting Physician: Yani Allen MD  Primary Sonographer:    Rajan Avila RDCS    CPT:                ECHO TTE WITH CON COMP W DOPP - .m;DEFINITY ECHO                      CONTRAST PER ML - .m;DEFINITY ECHO CONTRAST PER ML                      WASTED - .m  Indication(s):      Dyspnea, unspecified - R06.00  Procedure:          Transthoracic echocardiogram with 2-D, M-mode and complete                      spectral and color flow Doppler.  Ordering Location:  [facility]  Admission Status:   ED  Contrast Injection: Verbal consent was obtained for injection of Ultrasonic                      Enhancing Agent following a discussion of risks and                      benefits.                      Endocardial visualization enhanced with 2 ml of Definity            Ultrasound enhancing agent (Lot#:[id number] Exp.Date:2025-FEB                      Discarded Dose:8ml).  UEA Reaction:       Patient had no adverse reaction after injection of                      Ultrasound Enhancing Agent.  Study Information:  Image quality for this study is adequate.    _______________________________________________________________________________________     CONCLUSIONS:      1. Left ventricular cavity is mildly dilated. Left ventricular wall thickness is normal. Left ventricular systolic function is severely decreased with an ejection fraction of 27 % by Live's method of disks. Global left ventricular hypokinesis.   2. There is moderate (grade 2) left ventricular diastolic dysfunction, with elevated filling pressure.   3. The left atrium is moderately dilated.   4. The right atrium is severely dilated.   5. Severely enlarged right ventricular cavity size, with wall thickness, and borderline reduced systolic function.   6. RV systolic pressure is 59mmHg (moderately elevated). Given the elevated pulmonic gradients of 55mmHg. The pulmonary artery pressures are likely normal.   7. 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   8. Severe pulmonic regurgitation.   9. The inferior vena cava is dilated measuring 3.01 cm in diameter, (dilated >2.1cm) with abnormal inspiratory collapse (abnormal <50%) consistent with elevated right atrial pressure (~15, range 10-20mmHg).  10. No pericardial effusion seen.  11. A bioprosthetic valve replacement is present in the aortic position.  12. Compared to the transthoracic echocardiogram performed on 9/20/2018, The pulmonic valve gradients are higher and the EF is lower (previously 40%).    FINDINGS:     Left Ventricle:  After obtaining consent, Definity ultrasound enhancing agent was given for enhanced left ventricular opacification and improved delineation of the left ventricular endocardial borders. The left ventricular cavity is mildly dilated. Left ventricular wall thickness is normal. The interventricular septum is flattened in systole and diastole consistent with right ventricular pressure and volume overload. Left ventricular systolic function is severely decreasedwith a calculated ejection fraction of 27 % by the Live's biplane method of disks. There is global left ventricular hypokinesis. There is moderate (grade 2) left ventricular diastolic dysfunction, with elevated filling pressure. There is normal LVmass and normal geometry.     Right Ventricle:  After obtaining consent, Definity intravenous contrast was given for enhanced right heart opacification and improved delineation of the endocardial borders. The right ventricular cavity is severely enlarged in size, with normal wall thickness and borderline reduced systolic function. Tricuspid annular plane systolic excursion (TAPSE) is 1.7 cm (normal >=1.7 cm). Tricuspid annular tissue Doppler S' is 6.3 cm/s (normal >10 cm/s).     Left Atrium:  Theleft atrium is moderately dilated with an indexed volume of 45.50 ml/m².     Right Atrium:  The right atrium is severely dilated with an indexed volume of 48.07 ml/m².     Interatrial Septum:  The interatrial septum appears intact.     Aortic Valve:  A bioprosthetic valve replacement is present in the aortic position. The aortic valve is tricuspid with normal leaflet excursion. There is no aortic valve stenosis. The peak transaortic velocity is 1.14 m/s, peak transaortic gradient is 5.2 mmHg and mean transaortic gradient is 3.0 mmHg with an LVOT/aortic valve VTI ratio of 0.60. The aortic valve area is estimated at 4.53 cm² by the continuity equation. There is mild aortic regurgitation.     Mitral Valve:  An annuloplasty ring is noted in the mitral position. There is symmetric leaflet tethering. The transmitral peak gradient is 8.8 mmHg and mean gradient is 3.00 mmHg. There is mild mitral regurgitation.     Tricuspid Valve:  Structurally normal tricuspid valve with normal leaflet excursion. There is mild to moderate tricuspid regurgitation.     Pulmonic Valve:  Pulmonic valve is not well visualized, however appears calcified. There is moderate to severe pulmonic valve stenosis. There is severe pulmonic regurgitation.     Aorta:  The aortic root appears normal in size.     Pericardium:  No pericardial effusion seen.     Systemic Veins:  The inferior vena cava is dilated measuring 3.01 cm in diameter, (dilated >2.1cm) with abnormal inspiratory collapse (abnormal <50%) consistent withelevated right atrial pressure (~15, range 10-20mmHg).    < end of copied text >

## 2024-03-18 NOTE — DIETITIAN INITIAL EVALUATION ADULT - NSFNSGIIOFT_GEN_A_CORE
Pt denies any current N/V/D/C. Per pt, last BM x today. No current bowel regimen in place. Ordered for PPI.

## 2024-03-18 NOTE — DIETITIAN INITIAL EVALUATION ADULT - ENERGY INTAKE
In house, pt reports good appetite and PO intake. Flowsheets indicate adequate (%) PO intake.  Adequate (%)

## 2024-03-18 NOTE — CONSULT NOTE ADULT - ASSESSMENT
47 y/o male, with PMHx of CAD-s/p DESx2 LAD 2016 (in [facility], by Dr Dumont), Rheumatic fever-S/P MV repair, S/P Bio AVR, CHF, presents to the ER with complaint of b/l leg swelling L>R. states has been present for a year. states swelling comes and goes. states recently noticed swelling present for longer period of time and not resolving. denies f/n/v/d, CP, SOB, HA, dizziness, abdominal pain, urinary symptoms, numbness, tingling, fall.     (11 Mar 2024 23:30)    Mr. Ayers reports prior AVR/MV Repair at [facility] twenty years ago. He has followed closely outpt with Dr. Canada for primary cardiology care. He reports several weeks of increasing abdominal distention, decreased appetite, and LE edema L>R. He has had no dyspnea, angina, PND, or orthopnea. The Structural Team was consulted for evaluation of pulmonic regurgitation and stenosis as noted on echocardiogram.

## 2024-03-18 NOTE — DIETITIAN INITIAL EVALUATION ADULT - PERTINENT LABORATORY DATA
03-17    138  |  102  |  30<H>  ----------------------------<  103<H>  4.2   |  23  |  0.99    Ca    10.0      17 Mar 2024 06:06  Mg     2.1     03-17

## 2024-03-18 NOTE — DIETITIAN INITIAL EVALUATION ADULT - ADD RECOMMEND
1) Recommend change to DASH diet as tolerated. Defer texture/consistency to SLP/team.   2) Recommend Multivitamin daily to prevent micronutrient deficiencies pending no medical contraindications.  3) Continue to monitor PO intake, weight, labs, skin, GI status, and diet.  4) RD remains available for diet education PRN.

## 2024-03-18 NOTE — CONSULT NOTE ADULT - PROBLEM SELECTOR RECOMMENDATION 9
Structural evaluation for pulmonic stenosis and regurgitation.   Cardiac CT completed today  Case will be reviewed with Dr. Lee and Dr. Brown including CT and TTE, final treatment recommendations pending      SATISH Curiel NP  Available on TEAMS

## 2024-03-19 ENCOUNTER — NON-APPOINTMENT (OUTPATIENT)
Age: 49
End: 2024-03-19

## 2024-03-19 ENCOUNTER — TRANSCRIPTION ENCOUNTER (OUTPATIENT)
Age: 49
End: 2024-03-19

## 2024-03-19 VITALS — DIASTOLIC BLOOD PRESSURE: 81 MMHG | HEART RATE: 61 BPM | SYSTOLIC BLOOD PRESSURE: 129 MMHG

## 2024-03-19 LAB
ALBUMIN SERPL ELPH-MCNC: 4.6 G/DL — SIGNIFICANT CHANGE UP (ref 3.3–5)
ALP SERPL-CCNC: 227 U/L — HIGH (ref 40–120)
ALT FLD-CCNC: 77 U/L — HIGH (ref 10–45)
ANION GAP SERPL CALC-SCNC: 11 MMOL/L — SIGNIFICANT CHANGE UP (ref 5–17)
AST SERPL-CCNC: 64 U/L — HIGH (ref 10–40)
BILIRUB SERPL-MCNC: 1.3 MG/DL — HIGH (ref 0.2–1.2)
BUN SERPL-MCNC: 32 MG/DL — HIGH (ref 7–23)
CALCIUM SERPL-MCNC: 10.2 MG/DL — SIGNIFICANT CHANGE UP (ref 8.4–10.5)
CHLORIDE SERPL-SCNC: 102 MMOL/L — SIGNIFICANT CHANGE UP (ref 96–108)
CO2 SERPL-SCNC: 24 MMOL/L — SIGNIFICANT CHANGE UP (ref 22–31)
CREAT SERPL-MCNC: 0.94 MG/DL — SIGNIFICANT CHANGE UP (ref 0.5–1.3)
EGFR: 101 ML/MIN/1.73M2 — SIGNIFICANT CHANGE UP
GLUCOSE SERPL-MCNC: 168 MG/DL — HIGH (ref 70–99)
HCT VFR BLD CALC: 47.4 % — SIGNIFICANT CHANGE UP (ref 39–50)
HGB BLD-MCNC: 15.8 G/DL — SIGNIFICANT CHANGE UP (ref 13–17)
MCHC RBC-ENTMCNC: 29.3 PG — SIGNIFICANT CHANGE UP (ref 27–34)
MCHC RBC-ENTMCNC: 33.3 GM/DL — SIGNIFICANT CHANGE UP (ref 32–36)
MCV RBC AUTO: 87.9 FL — SIGNIFICANT CHANGE UP (ref 80–100)
NRBC # BLD: 0 /100 WBCS — SIGNIFICANT CHANGE UP (ref 0–0)
PLATELET # BLD AUTO: 203 K/UL — SIGNIFICANT CHANGE UP (ref 150–400)
POTASSIUM SERPL-MCNC: 4.7 MMOL/L — SIGNIFICANT CHANGE UP (ref 3.5–5.3)
POTASSIUM SERPL-SCNC: 4.7 MMOL/L — SIGNIFICANT CHANGE UP (ref 3.5–5.3)
PROT SERPL-MCNC: 8.5 G/DL — HIGH (ref 6–8.3)
RBC # BLD: 5.39 M/UL — SIGNIFICANT CHANGE UP (ref 4.2–5.8)
RBC # FLD: 12.3 % — SIGNIFICANT CHANGE UP (ref 10.3–14.5)
SODIUM SERPL-SCNC: 137 MMOL/L — SIGNIFICANT CHANGE UP (ref 135–145)
WBC # BLD: 3.53 K/UL — LOW (ref 3.8–10.5)
WBC # FLD AUTO: 3.53 K/UL — LOW (ref 3.8–10.5)

## 2024-03-19 PROCEDURE — C8929: CPT

## 2024-03-19 PROCEDURE — 75574 CT ANGIO HRT W/3D IMAGE: CPT | Mod: MC

## 2024-03-19 PROCEDURE — 93005 ELECTROCARDIOGRAM TRACING: CPT

## 2024-03-19 PROCEDURE — 80053 COMPREHEN METABOLIC PANEL: CPT

## 2024-03-19 PROCEDURE — 83880 ASSAY OF NATRIURETIC PEPTIDE: CPT

## 2024-03-19 PROCEDURE — 74174 CTA ABD&PLVS W/CONTRAST: CPT | Mod: MC

## 2024-03-19 PROCEDURE — 85027 COMPLETE CBC AUTOMATED: CPT

## 2024-03-19 PROCEDURE — 80048 BASIC METABOLIC PNL TOTAL CA: CPT

## 2024-03-19 PROCEDURE — 99285 EMERGENCY DEPT VISIT HI MDM: CPT | Mod: 25

## 2024-03-19 PROCEDURE — 99233 SBSQ HOSP IP/OBS HIGH 50: CPT

## 2024-03-19 PROCEDURE — C1889: CPT

## 2024-03-19 PROCEDURE — 84484 ASSAY OF TROPONIN QUANT: CPT

## 2024-03-19 PROCEDURE — 93970 EXTREMITY STUDY: CPT

## 2024-03-19 PROCEDURE — C1769: CPT

## 2024-03-19 PROCEDURE — 85025 COMPLETE CBC W/AUTO DIFF WBC: CPT

## 2024-03-19 PROCEDURE — 71275 CT ANGIOGRAPHY CHEST: CPT | Mod: MC

## 2024-03-19 PROCEDURE — 93460 R&L HRT ART/VENTRICLE ANGIO: CPT

## 2024-03-19 PROCEDURE — 71045 X-RAY EXAM CHEST 1 VIEW: CPT

## 2024-03-19 PROCEDURE — C1894: CPT

## 2024-03-19 PROCEDURE — 82803 BLOOD GASES ANY COMBINATION: CPT

## 2024-03-19 PROCEDURE — 75574 CT ANGIO HRT W/3D IMAGE: CPT | Mod: 26

## 2024-03-19 PROCEDURE — C1887: CPT

## 2024-03-19 PROCEDURE — 83735 ASSAY OF MAGNESIUM: CPT

## 2024-03-19 PROCEDURE — 36415 COLL VENOUS BLD VENIPUNCTURE: CPT

## 2024-03-19 RX ORDER — FUROSEMIDE 40 MG
1 TABLET ORAL
Qty: 30 | Refills: 0
Start: 2024-03-19 | End: 2024-04-17

## 2024-03-19 RX ORDER — CARVEDILOL PHOSPHATE 80 MG/1
1 CAPSULE, EXTENDED RELEASE ORAL
Qty: 60 | Refills: 0
Start: 2024-03-19 | End: 2024-04-17

## 2024-03-19 RX ORDER — ATORVASTATIN CALCIUM 80 MG/1
1 TABLET, FILM COATED ORAL
Qty: 30 | Refills: 0
Start: 2024-03-19 | End: 2024-04-17

## 2024-03-19 RX ORDER — PANTOPRAZOLE SODIUM 20 MG/1
1 TABLET, DELAYED RELEASE ORAL
Qty: 30 | Refills: 0
Start: 2024-03-19 | End: 2024-04-17

## 2024-03-19 RX ORDER — SPIRONOLACTONE 25 MG/1
1 TABLET, FILM COATED ORAL
Qty: 30 | Refills: 0
Start: 2024-03-19 | End: 2024-04-17

## 2024-03-19 RX ADMIN — Medication 81 MILLIGRAM(S): at 11:58

## 2024-03-19 RX ADMIN — PANTOPRAZOLE SODIUM 40 MILLIGRAM(S): 20 TABLET, DELAYED RELEASE ORAL at 05:32

## 2024-03-19 RX ADMIN — SACUBITRIL AND VALSARTAN 1 TABLET(S): 24; 26 TABLET, FILM COATED ORAL at 17:38

## 2024-03-19 RX ADMIN — CARVEDILOL PHOSPHATE 6.25 MILLIGRAM(S): 80 CAPSULE, EXTENDED RELEASE ORAL at 17:38

## 2024-03-19 RX ADMIN — SACUBITRIL AND VALSARTAN 1 TABLET(S): 24; 26 TABLET, FILM COATED ORAL at 05:32

## 2024-03-19 RX ADMIN — CARVEDILOL PHOSPHATE 6.25 MILLIGRAM(S): 80 CAPSULE, EXTENDED RELEASE ORAL at 05:32

## 2024-03-19 RX ADMIN — SPIRONOLACTONE 25 MILLIGRAM(S): 25 TABLET, FILM COATED ORAL at 05:31

## 2024-03-19 RX ADMIN — Medication 40 MILLIGRAM(S): at 05:32

## 2024-03-19 NOTE — DISCHARGE NOTE PROVIDER - NSDCMRMEDTOKEN_GEN_ALL_CORE_FT
aspirin 81 mg oral delayed release tablet: 1 tab(s) orally once a day  atorvastatin 20 mg oral tablet: 1 tab(s) orally once a day (at bedtime)  carvedilol 6.25 mg oral tablet: 1 tab(s) orally every 12 hours  furosemide 40 mg oral tablet: 1 tab(s) orally once a day  pantoprazole 40 mg oral delayed release tablet: 1 tab(s) orally once a day (before a meal)  sacubitril-valsartan 49 mg-51 mg oral tablet: 1 tab(s) orally 2 times a day    home and hospital  spironolactone 25 mg oral tablet: 1 tab(s) orally once a day

## 2024-03-19 NOTE — DISCHARGE NOTE PROVIDER - HOSPITAL COURSE
49 y/o male, with PMHx of CAD-s/p DESx2 LAD 2016 (in [facility], by Dr Dumont), Rheumatic fever-S/P MV repair, S/P Bio AVR, CHF, presents to the ER with complaint of b/l leg swelling L>R. states has been present for a year. states swelling comes and goes. states recently noticed swelling present for longer period of time and not resolving. denies f/n/v/d, CP, SOB, HA, dizziness, abdominal pain, urinary symptoms, numbness, tingling, fall.  chf systolic acute on chronic  / le edema with hx of ischemic cardiomyopathy  tele noted  TTE today  continue all cardiac meds, non compliant to meds  awaiting doppler results r/o dvt, negative  follow up renal function, awaiting labs  tsh  will need R/L heart cath/ possible CHAD to asses PS and aortic valve  continue diuresis  will add Farxiga up[on dc  will increase Entresto as tolerated  R/L heart cath noted  continue Diuresis,  PS plan ?possible valvuloplasty vs surgery will ask structural heart to see pt  Dr Lee and Stephanie called yesterday awaiting recommendations  doing much better  discussed  with Dr Lee structural heart he will discuss with dr Brown ct surgery  awaiting cardiac ct, change lasix to po  pt most probably fad ROSS procedure which may explains the pulmonary stenosis  awaiting possibility of valvuloplasty and stent placement

## 2024-03-19 NOTE — PROGRESS NOTE ADULT - SUBJECTIVE AND OBJECTIVE BOX
Date of Service: 03-15-24 @ 06:38           CARDIOLOGY     PROGRESS  NOTE   ________________________________________________    CHIEF COMPLAINT:Patient is a 48y old  Male who presents with a chief complaint of edema/ chf (14 Mar 2024 05:44)  doing better  	  REVIEW OF SYSTEMS:  CONSTITUTIONAL: No fever, weight loss, or fatigue  EYES: No eye pain, visual disturbances, or discharge  ENT:  No difficulty hearing, tinnitus, vertigo; No sinus or throat pain  NECK: No pain or stiffness  RESPIRATORY: No cough, wheezing, chills or hemoptysis; No Shortness of Breath  CARDIOVASCULAR: No chest pain, palpitations, passing out, dizziness, or leg swelling  GASTROINTESTINAL: No abdominal or epigastric pain. No nausea, vomiting, or hematemesis; No diarrhea or constipation. No melena or hematochezia.  GENITOURINARY: No dysuria, frequency, hematuria, or incontinence  NEUROLOGICAL: No headaches, memory loss, loss of strength, numbness, or tremors  SKIN: No itching, burning, rashes, or lesions   LYMPH Nodes: No enlarged glands  ENDOCRINE: No heat or cold intolerance; No hair loss  MUSCULOSKELETAL: No joint pain or swelling; No muscle, back, or extremity pain  PSYCHIATRIC: No depression, anxiety, mood swings, or difficulty sleeping  HEME/LYMPH: No easy bruising, or bleeding gums  ALLERGY AND IMMUNOLOGIC: No hives or eczema	    [x ] All others negative	  [ ] Unable to obtain    PHYSICAL EXAM:  T(C): 36.7 (03-15-24 @ 04:57), Max: 36.7 (03-14-24 @ 11:24)  HR: 60 (03-15-24 @ 04:57) (60 - 66)  BP: 127/76 (03-15-24 @ 04:57) (110/73 - 139/91)  RR: 18 (03-15-24 @ 04:57) (12 - 18)  SpO2: 99% (03-15-24 @ 04:57) (97% - 99%)  Wt(kg): --  I&O's Summary    13 Mar 2024 07:01  -  14 Mar 2024 07:00  --------------------------------------------------------  IN: 600 mL / OUT: 2250 mL / NET: -1650 mL    14 Mar 2024 07:01  -  15 Mar 2024 06:38  --------------------------------------------------------  IN: 360 mL / OUT: 3075 mL / NET: -2715 mL        Appearance: Normal	  HEENT:   Normal oral mucosa, PERRL, EOMI	  Lymphatic: No lymphadenopathy  Cardiovascular: Normal S1 S2, No JVD, +murmurs, No edema  Respiratory: rhonchi  Psychiatry: A & O x 3, Mood & affect appropriate  Gastrointestinal:  Soft, Non-tender, + BS	  Skin: No rashes, No ecchymoses, No cyanosis	  Neurologic: Non-focal  Extremities: Normal range of motion, No clubbing, cyanosis or edema  Vascular: Peripheral pulses palpable 2+ bilaterally    MEDICATIONS  (STANDING):  aspirin enteric coated 81 milliGRAM(s) Oral daily  atorvastatin 20 milliGRAM(s) Oral at bedtime  carvedilol 6.25 milliGRAM(s) Oral every 12 hours  enoxaparin Injectable 40 milliGRAM(s) SubCutaneous every 24 hours  furosemide   Injectable 20 milliGRAM(s) IV Push two times a day  pantoprazole    Tablet 40 milliGRAM(s) Oral before breakfast  sacubitril 49 mG/valsartan 51 mG 1 Tablet(s) Oral two times a day  spironolactone 25 milliGRAM(s) Oral daily      TELEMETRY: 	    ECG:  	  RADIOLOGY:  OTHER: 	  	  LABS:	 	    CARDIAC MARKERS:        03-15    139  |  103  |  23  ----------------------------<  100<H>  3.7   |  24  |  0.89    Ca    9.6      15 Mar 2024 06:01      proBNP:   Lipid Profile:   HgA1c:   TSH:     < from: Cardiac Catheterization (03.14.24 @ 14:29) >  LM   Left main artery: Angiography shows no disease.      LAD   Left anterior descending artery: Angiography shows minor  irregularities. Patent stent .    CX   Circumflex: Angiography shows minor irregularities.      RCA   Right coronary artery: Angiography shows minor irregularities.            Assessment and plan  ---------------------------   49 y/o male, with PMHx of CAD-s/p DESx2 LAD 2016 (in [facility], by Dr Dumont), Rheumatic fever-S/P MV repair, S/P Bio AVR, CHF, presents to the ER with complaint of b/l leg swelling L>R. states has been present for a year. states swelling comes and goes. states recently noticed swelling present for longer period of time and not resolving. denies f/n/v/d, CP, SOB, HA, dizziness, abdominal pain, urinary symptoms, numbness, tingling, fall.  chf systolic acute on chronic  / le edema with hx of ischemic cardiomyopathy  tele noted  TTE today  continue all cardiac meds, non compliant to meds  awaiting doppler results r/o dvt, negative  follow up renal function, awaiting labs  tsh  lasix 20 mg iv bid  will need R/L heart cath/ possible CHAD to asses PS and aortic valve  continue diuresis  will add Farxiga up[on dc  will increase entresto as tolerated  uo, negative 4.5 liter  R/L heart cath noted  continue Diuresis,  PS plan ?possible valvuloplasty vs surgery will ask structural heart to see pt  	        
Date of Service: 03-16-24 @ 07:53           CARDIOLOGY     PROGRESS  NOTE   ________________________________________________    CHIEF COMPLAINT:Patient is a 48y old  Male who presents with a chief complaint of edema/ chf (15 Mar 2024 06:38)  no complain  	  REVIEW OF SYSTEMS:  CONSTITUTIONAL: No fever, weight loss, or fatigue  EYES: No eye pain, visual disturbances, or discharge  ENT:  No difficulty hearing, tinnitus, vertigo; No sinus or throat pain  NECK: No pain or stiffness  RESPIRATORY: No cough, wheezing, chills or hemoptysis; No Shortness of Breath  CARDIOVASCULAR: No chest pain, palpitations, passing out, dizziness, or leg swelling  GASTROINTESTINAL: No abdominal or epigastric pain. No nausea, vomiting, or hematemesis; No diarrhea or constipation. No melena or hematochezia.  GENITOURINARY: No dysuria, frequency, hematuria, or incontinence  NEUROLOGICAL: No headaches, memory loss, loss of strength, numbness, or tremors  SKIN: No itching, burning, rashes, or lesions   LYMPH Nodes: No enlarged glands  ENDOCRINE: No heat or cold intolerance; No hair loss  MUSCULOSKELETAL: No joint pain or swelling; No muscle, back, or extremity pain  PSYCHIATRIC: No depression, anxiety, mood swings, or difficulty sleeping  HEME/LYMPH: No easy bruising, or bleeding gums  ALLERGY AND IMMUNOLOGIC: No hives or eczema	    [x ] All others negative	  [ ] Unable to obtain    PHYSICAL EXAM:  T(C): 36.7 (03-16-24 @ 05:18), Max: 36.9 (03-15-24 @ 11:41)  HR: 62 (03-16-24 @ 05:18) (60 - 67)  BP: 124/79 (03-16-24 @ 05:18) (106/71 - 131/86)  RR: 18 (03-16-24 @ 05:18) (18 - 18)  SpO2: 98% (03-16-24 @ 05:18) (95% - 98%)  Wt(kg): --  I&O's Summary    15 Mar 2024 07:01  -  16 Mar 2024 07:00  --------------------------------------------------------  IN: 600 mL / OUT: 3150 mL / NET: -2550 mL        Appearance: Normal	  HEENT:   Normal oral mucosa, PERRL, EOMI	  Lymphatic: No lymphadenopathy  Cardiovascular: Normal S1 S2, No JVD, + murmurs, No edema  Respiratory:rhonchi  Psychiatry: A & O x 3, Mood & affect appropriate  Gastrointestinal:  Soft, Non-tender, + BS	  Skin: No rashes, No ecchymoses, No cyanosis	  Neurologic: Non-focal  Extremities: Normal range of motion, No clubbing, cyanosis or edema  Vascular: Peripheral pulses palpable 2+ bilaterally    MEDICATIONS  (STANDING):  aspirin enteric coated 81 milliGRAM(s) Oral daily  atorvastatin 20 milliGRAM(s) Oral at bedtime  carvedilol 6.25 milliGRAM(s) Oral every 12 hours  enoxaparin Injectable 40 milliGRAM(s) SubCutaneous every 24 hours  furosemide   Injectable 20 milliGRAM(s) IV Push two times a day  pantoprazole    Tablet 40 milliGRAM(s) Oral before breakfast  sacubitril 49 mG/valsartan 51 mG 1 Tablet(s) Oral two times a day  spironolactone 25 milliGRAM(s) Oral daily      TELEMETRY: 	    ECG:  	  RADIOLOGY:  OTHER: 	  	  LABS:	 	    CARDIAC MARKERS:            03-15    139  |  103  |  23  ----------------------------<  100<H>  3.7   |  24  |  0.89    Ca    9.6      15 Mar 2024 06:01      proBNP:   Lipid Profile:   HgA1c:   TSH:       Assessment and plan  ---------------------------   47 y/o male, with PMHx of CAD-s/p DESx2 LAD 2016 (in [facility], by Dr Dumont), Rheumatic fever-S/P MV repair, S/P Bio AVR, CHF, presents to the ER with complaint of b/l leg swelling L>R. states has been present for a year. states swelling comes and goes. states recently noticed swelling present for longer period of time and not resolving. denies f/n/v/d, CP, SOB, HA, dizziness, abdominal pain, urinary symptoms, numbness, tingling, fall.  chf systolic acute on chronic  / le edema with hx of ischemic cardiomyopathy  tele noted  TTE today  continue all cardiac meds, non compliant to meds  awaiting doppler results r/o dvt, negative  follow up renal function, awaiting labs  tsh  lasix 20 mg iv bid  will need R/L heart cath/ possible CHAD to asses PS and aortic valve  continue diuresis  will add Farxiga up[on dc  will increase Entresto as tolerated  uo, negative 4.5 liter  R/L heart cath noted  continue Diuresis,  PS plan ?possible valvuloplasty vs surgery will ask structural heart to see pt  Dr Lee and Stephanie called yesterday awaiting recommendations  doing much better    	        
Date of Service: 03-17-24 @ 07:16           CARDIOLOGY     PROGRESS  NOTE   ________________________________________________    CHIEF COMPLAINT:Patient is a 48y old  Male who presents with a chief complaint of edema/ chf (16 Mar 2024 10:15)  doing well  	  REVIEW OF SYSTEMS:  CONSTITUTIONAL: No fever, weight loss, or fatigue  EYES: No eye pain, visual disturbances, or discharge  ENT:  No difficulty hearing, tinnitus, vertigo; No sinus or throat pain  NECK: No pain or stiffness  RESPIRATORY: No cough, wheezing, chills or hemoptysis; No Shortness of Breath  CARDIOVASCULAR: No chest pain, palpitations, passing out, dizziness, or leg swelling  GASTROINTESTINAL: No abdominal or epigastric pain. No nausea, vomiting, or hematemesis; No diarrhea or constipation. No melena or hematochezia.  GENITOURINARY: No dysuria, frequency, hematuria, or incontinence  NEUROLOGICAL: No headaches, memory loss, loss of strength, numbness, or tremors  SKIN: No itching, burning, rashes, or lesions   LYMPH Nodes: No enlarged glands  ENDOCRINE: No heat or cold intolerance; No hair loss  MUSCULOSKELETAL: No joint pain or swelling; No muscle, back, or extremity pain  PSYCHIATRIC: No depression, anxiety, mood swings, or difficulty sleeping  HEME/LYMPH: No easy bruising, or bleeding gums  ALLERGY AND IMMUNOLOGIC: No hives or eczema	    [x ] All others negative	  [ ] Unable to obtain    PHYSICAL EXAM:  T(C): 36.5 (03-17-24 @ 05:09), Max: 36.8 (03-16-24 @ 13:20)  HR: 58 (03-17-24 @ 05:09) (56 - 64)  BP: 103/64 (03-17-24 @ 05:09) (103/64 - 119/80)  RR: 18 (03-17-24 @ 05:09) (18 - 18)  SpO2: 98% (03-17-24 @ 05:09) (98% - 98%)  Wt(kg): --  I&O's Summary    16 Mar 2024 07:01  -  17 Mar 2024 07:00  --------------------------------------------------------  IN: 840 mL / OUT: 1900 mL / NET: -1060 mL        Appearance: Normal	  HEENT:   Normal oral mucosa, PERRL, EOMI	  Lymphatic: No lymphadenopathy  Cardiovascular: Normal S1 S2, No JVD, + murmurs, No edema  Respiratory: rhonchi  Psychiatry: A & O x 3, Mood & affect appropriate  Gastrointestinal:  Soft, Non-tender, + BS	  Skin: No rashes, No ecchymoses, No cyanosis	  Neurologic: Non-focal  Extremities: Normal range of motion, No clubbing, cyanosis or edema  Vascular: Peripheral pulses palpable 2+ bilaterally    MEDICATIONS  (STANDING):  aspirin enteric coated 81 milliGRAM(s) Oral daily  atorvastatin 20 milliGRAM(s) Oral at bedtime  carvedilol 6.25 milliGRAM(s) Oral every 12 hours  enoxaparin Injectable 40 milliGRAM(s) SubCutaneous every 24 hours  furosemide   Injectable 20 milliGRAM(s) IV Push two times a day  pantoprazole    Tablet 40 milliGRAM(s) Oral before breakfast  sacubitril 49 mG/valsartan 51 mG 1 Tablet(s) Oral two times a day  spironolactone 25 milliGRAM(s) Oral daily      TELEMETRY: 	    ECG:  	  RADIOLOGY:  OTHER: 	  	  LABS:	 	    CARDIAC MARKERS:            03-17    138  |  102  |  30<H>  ----------------------------<  103<H>  4.2   |  23  |  0.99    Ca    10.0      17 Mar 2024 06:06  Mg     2.1     03-17      proBNP:   Lipid Profile:   HgA1c:   TSH:           Assessment and plan  ---------------------------   47 y/o male, with PMHx of CAD-s/p DESx2 LAD 2016 (in [facility], by Dr Dumont), Rheumatic fever-S/P MV repair, S/P Bio AVR, CHF, presents to the ER with complaint of b/l leg swelling L>R. states has been present for a year. states swelling comes and goes. states recently noticed swelling present for longer period of time and not resolving. denies f/n/v/d, CP, SOB, HA, dizziness, abdominal pain, urinary symptoms, numbness, tingling, fall.  chf systolic acute on chronic  / le edema with hx of ischemic cardiomyopathy  tele noted  TTE today  continue all cardiac meds, non compliant to meds  awaiting doppler results r/o dvt, negative  follow up renal function, awaiting labs  tsh  lasix 20 mg iv bid  will need R/L heart cath/ possible CHAD to asses PS and aortic valve  continue diuresis  will add Farxiga up[on dc  will increase Entresto as tolerated  uo, negative 4.5 liter  R/L heart cath noted  continue Diuresis,  PS plan ?possible valvuloplasty vs surgery will ask structural heart to see pt  Dr Lee and Stephanie called yesterday awaiting recommendations  doing much better  discussed  with Dr Lee structural heart he will discuss with dr Brown ct surgery  awaiting cardiac ct      	        
Date of Service: 03-18-24 @ 06:41           CARDIOLOGY     PROGRESS  NOTE   ________________________________________________    CHIEF COMPLAINT:Patient is a 48y old  Male who presents with a chief complaint of edema/ chf (17 Mar 2024 07:16)  no complain  	  REVIEW OF SYSTEMS:  CONSTITUTIONAL: No fever, weight loss, or fatigue  EYES: No eye pain, visual disturbances, or discharge  ENT:  No difficulty hearing, tinnitus, vertigo; No sinus or throat pain  NECK: No pain or stiffness  RESPIRATORY: No cough, wheezing, chills or hemoptysis; No Shortness of Breath  CARDIOVASCULAR: No chest pain, palpitations, passing out, dizziness, or leg swelling  GASTROINTESTINAL: No abdominal or epigastric pain. No nausea, vomiting, or hematemesis; No diarrhea or constipation. No melena or hematochezia.  GENITOURINARY: No dysuria, frequency, hematuria, or incontinence  NEUROLOGICAL: No headaches, memory loss, loss of strength, numbness, or tremors  SKIN: No itching, burning, rashes, or lesions   LYMPH Nodes: No enlarged glands  ENDOCRINE: No heat or cold intolerance; No hair loss  MUSCULOSKELETAL: No joint pain or swelling; No muscle, back, or extremity pain  PSYCHIATRIC: No depression, anxiety, mood swings, or difficulty sleeping  HEME/LYMPH: No easy bruising, or bleeding gums  ALLERGY AND IMMUNOLOGIC: No hives or eczema	    [ x] All others negative	  [ ] Unable to obtain    PHYSICAL EXAM:  T(C): 36.5 (03-18-24 @ 06:06), Max: 36.9 (03-17-24 @ 12:18)  HR: 59 (03-18-24 @ 06:06) (59 - 64)  BP: 117/71 (03-18-24 @ 06:06) (101/68 - 118/80)  RR: 18 (03-18-24 @ 06:06) (18 - 18)  SpO2: 97% (03-18-24 @ 06:06) (96% - 97%)  Wt(kg): --  I&O's Summary    16 Mar 2024 07:01  -  17 Mar 2024 07:00  --------------------------------------------------------  IN: 840 mL / OUT: 1900 mL / NET: -1060 mL    17 Mar 2024 07:01  -  18 Mar 2024 06:41  --------------------------------------------------------  IN: 840 mL / OUT: 2350 mL / NET: -1510 mL        Appearance: Normal	  HEENT:   Normal oral mucosa, PERRL, EOMI	  Lymphatic: No lymphadenopathy  Cardiovascular: Normal S1 S2, No JVD, +murmurs, No edema  Respiratory: Lungs clear to auscultation	  Psychiatry: A & O x 3, Mood & affect appropriate  Gastrointestinal:  Soft, Non-tender, + BS	  Skin: No rashes, No ecchymoses, No cyanosis	  Neurologic: Non-focal  Extremities: Normal range of motion, No clubbing, cyanosis or edema  Vascular: Peripheral pulses palpable 2+ bilaterally    MEDICATIONS  (STANDING):  aspirin enteric coated 81 milliGRAM(s) Oral daily  atorvastatin 20 milliGRAM(s) Oral at bedtime  carvedilol 6.25 milliGRAM(s) Oral every 12 hours  enoxaparin Injectable 40 milliGRAM(s) SubCutaneous every 24 hours  furosemide   Injectable 20 milliGRAM(s) IV Push two times a day  pantoprazole    Tablet 40 milliGRAM(s) Oral before breakfast  sacubitril 49 mG/valsartan 51 mG 1 Tablet(s) Oral two times a day  spironolactone 25 milliGRAM(s) Oral daily      TELEMETRY: 	    ECG:  	  RADIOLOGY:  OTHER: 	  	  LABS:	 	    CARDIAC MARKERS:        03-17    138  |  102  |  30<H>  ----------------------------<  103<H>  4.2   |  23  |  0.99    Ca    10.0      17 Mar 2024 06:06  Mg     2.1     03-17      proBNP:   Lipid Profile:   HgA1c:   TSH:         Assessment and plan  ---------------------------   49 y/o male, with PMHx of CAD-s/p DESx2 LAD 2016 (in [facility], by Dr Dumont), Rheumatic fever-S/P MV repair, S/P Bio AVR, CHF, presents to the ER with complaint of b/l leg swelling L>R. states has been present for a year. states swelling comes and goes. states recently noticed swelling present for longer period of time and not resolving. denies f/n/v/d, CP, SOB, HA, dizziness, abdominal pain, urinary symptoms, numbness, tingling, fall.  chf systolic acute on chronic  / le edema with hx of ischemic cardiomyopathy  tele noted  TTE today  continue all cardiac meds, non compliant to meds  awaiting doppler results r/o dvt, negative  follow up renal function, awaiting labs  tsh  lasix 20 mg iv bid  will need R/L heart cath/ possible CHAD to asses PS and aortic valve  continue diuresis  will add Farxiga up[on dc  will increase Entresto as tolerated  uo, negative 4.5 liter  R/L heart cath noted  continue Diuresis,  PS plan ?possible valvuloplasty vs surgery will ask structural heart to see pt  Dr Lee and Stephanie called yesterday awaiting recommendations  doing much better  discussed  with Dr Lee structural heart he will discuss with dr Brown ct surgery  awaiting cardiac ct, change lasix to po    	        
Date of Service: 03-19-24 @ 07:04           CARDIOLOGY     PROGRESS  NOTE   ________________________________________________    CHIEF COMPLAINT:Patient is a 48y old  Male who presents with a chief complaint of edema/ chf (18 Mar 2024 14:38)  no complain  	  REVIEW OF SYSTEMS:  CONSTITUTIONAL: No fever, weight loss, or fatigue  EYES: No eye pain, visual disturbances, or discharge  ENT:  No difficulty hearing, tinnitus, vertigo; No sinus or throat pain  NECK: No pain or stiffness  RESPIRATORY: No cough, wheezing, chills or hemoptysis; No Shortness of Breath  CARDIOVASCULAR: No chest pain, palpitations, passing out, dizziness, or leg swelling  GASTROINTESTINAL: No abdominal or epigastric pain. No nausea, vomiting, or hematemesis; No diarrhea or constipation. No melena or hematochezia.  GENITOURINARY: No dysuria, frequency, hematuria, or incontinence  NEUROLOGICAL: No headaches, memory loss, loss of strength, numbness, or tremors  SKIN: No itching, burning, rashes, or lesions   LYMPH Nodes: No enlarged glands  ENDOCRINE: No heat or cold intolerance; No hair loss  MUSCULOSKELETAL: No joint pain or swelling; No muscle, back, or extremity pain  PSYCHIATRIC: No depression, anxiety, mood swings, or difficulty sleeping  HEME/LYMPH: No easy bruising, or bleeding gums  ALLERGY AND IMMUNOLOGIC: No hives or eczema	    [x ] All others negative	  [ ] Unable to obtain    PHYSICAL EXAM:  T(C): 36.3 (03-19-24 @ 05:08), Max: 36.5 (03-18-24 @ 11:26)  HR: 58 (03-19-24 @ 05:08) (54 - 60)  BP: 119/66 (03-19-24 @ 05:08) (103/70 - 125/82)  RR: 18 (03-19-24 @ 05:08) (18 - 18)  SpO2: 98% (03-19-24 @ 05:08) (96% - 99%)  Wt(kg): --  I&O's Summary    18 Mar 2024 07:01  -  19 Mar 2024 07:00  --------------------------------------------------------  IN: 600 mL / OUT: 1000 mL / NET: -400 mL        Appearance: Normal	  HEENT:   Normal oral mucosa, PERRL, EOMI	  Lymphatic: No lymphadenopathy  Cardiovascular: Normal S1 S2, No JVD, + murmurs, No edema  Respiratory: rhonchi  Psychiatry: A & O x 3, Mood & affect appropriate  Gastrointestinal:  Soft, Non-tender, + BS	  Skin: No rashes, No ecchymoses, No cyanosis	  Neurologic: Non-focal  Extremities: Normal range of motion, No clubbing, cyanosis or edema  Vascular: Peripheral pulses palpable 2+ bilaterally    MEDICATIONS  (STANDING):  aspirin enteric coated 81 milliGRAM(s) Oral daily  atorvastatin 20 milliGRAM(s) Oral at bedtime  carvedilol 6.25 milliGRAM(s) Oral every 12 hours  enoxaparin Injectable 40 milliGRAM(s) SubCutaneous every 24 hours  furosemide    Tablet 40 milliGRAM(s) Oral daily  pantoprazole    Tablet 40 milliGRAM(s) Oral before breakfast  sacubitril 49 mG/valsartan 51 mG 1 Tablet(s) Oral two times a day  spironolactone 25 milliGRAM(s) Oral daily      TELEMETRY: 	    ECG:  	  RADIOLOGY:  OTHER: 	  	  LABS:	 	    CARDIAC MARKERS:      proBNP:   Lipid Profile:   HgA1c:   TSH:     < from: CT Angio Chest TAVR CELENA w/wo IV Cont (03.18.24 @ 09:52) >  Bowel: The stomach is incompletely distended. The small  bowel is normal   in caliber. The large bowel is normal in caliber. There is increased   attenuation within the mesentery in the right upper quadrant, adjacent to   the ascending colon. No free air. Small amount of free fluid.    Vascular: Calcified and noncalcified plaque in the aorta. The iliac   artery is narrowed by 50% as ostium and along the proximal proximal   segment. The celiac artery is proximally patent. The SMA, renal arteries,   and MOHSEN are proximally patent. The portal vein is patent.    Bladder and reproductive system: The bladder is incomplete distended    Bones And Soft Tissues: The bones are unremarkable.  The soft tissues are   unremarkable.      Mr. Ayers reports prior AVR/MV Repair at [facility] twenty years ago. He has followed closely outpt with Dr. Canada for primary cardiology care. He reports several weeks of increasing abdominal distention, decreased appetite, and LE edema L>R. He has had no dyspnea, angina, PND, or orthopnea. The Structural Team was consulted for evaluation of pulmonic regurgitation and stenosis as noted on echocardiogram.       Assessment and plan  ---------------------------   47 y/o male, with PMHx of CAD-s/p DESx2 LAD 2016 (in [facility], by Dr Dumont), Rheumatic fever-S/P MV repair, S/P Bio AVR, CHF, presents to the ER with complaint of b/l leg swelling L>R. states has been present for a year. states swelling comes and goes. states recently noticed swelling present for longer period of time and not resolving. denies f/n/v/d, CP, SOB, HA, dizziness, abdominal pain, urinary symptoms, numbness, tingling, fall.  chf systolic acute on chronic  / le edema with hx of ischemic cardiomyopathy  tele noted  TTE today  continue all cardiac meds, non compliant to meds  awaiting doppler results r/o dvt, negative  follow up renal function, awaiting labs  tsh  will need R/L heart cath/ possible CHAD to asses PS and aortic valve  continue diuresis  will add Farxiga up[on dc  will increase Entresto as tolerated  R/L heart cath noted  continue Diuresis,  PS plan ?possible valvuloplasty vs surgery will ask structural heart to see pt  Dr Lee and Stepahnie called yesterday awaiting recommendations  doing much better  discussed  with Dr Lee structural heart he will discuss with dr Brown ct surgery  awaiting cardiac ct, change lasix to po  pt most probably fad ROSS procedure which may explains the pulmonary stenosis  awaiting possibility of valvuloplasty and stent placement    	        
Date of Service: 03-14-24 @ 05:44           CARDIOLOGY     PROGRESS  NOTE   ________________________________________________    CHIEF COMPLAINT:Patient is a 48y old  Male who presents with a chief complaint of edema/ chf (13 Mar 2024 05:40)  doing better  	  REVIEW OF SYSTEMS:  CONSTITUTIONAL: No fever, weight loss, or fatigue  EYES: No eye pain, visual disturbances, or discharge  ENT:  No difficulty hearing, tinnitus, vertigo; No sinus or throat pain  NECK: No pain or stiffness  RESPIRATORY: No cough, wheezing, chills or hemoptysis; decrease Shortness of Breath  CARDIOVASCULAR: No chest pain, palpitations, passing out, dizziness, or leg swelling  GASTROINTESTINAL: No abdominal or epigastric pain. No nausea, vomiting, or hematemesis; No diarrhea or constipation. No melena or hematochezia.  GENITOURINARY: No dysuria, frequency, hematuria, or incontinence  NEUROLOGICAL: No headaches, memory loss, loss of strength, numbness, or tremors  SKIN: No itching, burning, rashes, or lesions   LYMPH Nodes: No enlarged glands  ENDOCRINE: No heat or cold intolerance; No hair loss  MUSCULOSKELETAL: No joint pain or swelling; No muscle, back, or extremity pain  PSYCHIATRIC: No depression, anxiety, mood swings, or difficulty sleeping  HEME/LYMPH: No easy bruising, or bleeding gums  ALLERGY AND IMMUNOLOGIC: No hives or eczema	    [x ] All others negative	  [ ] Unable to obtain    PHYSICAL EXAM:  T(C): 36.4 (03-14-24 @ 05:19), Max: 36.9 (03-13-24 @ 20:06)  HR: 65 (03-14-24 @ 05:19) (61 - 65)  BP: 111/65 (03-14-24 @ 05:19) (111/65 - 134/83)  RR: 18 (03-14-24 @ 05:19) (18 - 18)  SpO2: 97% (03-14-24 @ 05:19) (97% - 98%)  Wt(kg): --  I&O's Summary    12 Mar 2024 07:01  -  13 Mar 2024 07:00  --------------------------------------------------------  IN: 240 mL / OUT: 2650 mL / NET: -2410 mL    13 Mar 2024 07:01  -  14 Mar 2024 05:44  --------------------------------------------------------  IN: 600 mL / OUT: 2250 mL / NET: -1650 mL        Appearance: Normal	  HEENT:   Normal oral mucosa, PERRL, EOMI	  Lymphatic: No lymphadenopathy  Cardiovascular: Normal S1 S2, + JVD, + murmurs, decrease  edema  Respiratory: rhonchi  Psychiatry: A & O x 3, Mood & affect appropriate  Gastrointestinal:  Soft, Non-tender, + BS	  Skin: No rashes, No ecchymoses, No cyanosis	  Neurologic: Non-focal  Extremities: Normal range of motion, No clubbing, cyanosis , + decrerase  edema  Vascular: Peripheral pulses palpable 2+ bilaterally    MEDICATIONS  (STANDING):  aspirin enteric coated 81 milliGRAM(s) Oral daily  atorvastatin 20 milliGRAM(s) Oral at bedtime  carvedilol 6.25 milliGRAM(s) Oral every 12 hours  enoxaparin Injectable 40 milliGRAM(s) SubCutaneous every 24 hours  furosemide   Injectable 20 milliGRAM(s) IV Push two times a day  pantoprazole    Tablet 40 milliGRAM(s) Oral before breakfast  sacubitril 49 mG/valsartan 51 mG 1 Tablet(s) Oral two times a day  spironolactone 25 milliGRAM(s) Oral daily      TELEMETRY: 	    ECG:  	  RADIOLOGY:  OTHER: 	  	  LABS:	 	    CARDIAC MARKERS:            03-13    139  |  104  |  17  ----------------------------<  100<H>  3.7   |  23  |  0.79    Ca    9.3      13 Mar 2024 06:40      proBNP:   Lipid Profile:   HgA1c:   TSH:     < from: TTE W or WO Ultrasound Enhancing Agent (03.12.24 @ 12:40) >   1. Left ventricular cavity is mildly dilated. Left ventricular wall thickness is normal. Left ventricular systolic function is severely decreased with an ejection fraction of 27 % by Live's method of disks. Global left ventricular hypokinesis.   2. There is moderate (grade 2) left ventricular diastolic dysfunction, with elevated filling pressure.   3. The left atrium is moderately dilated.   4. The right atrium is severely dilated.   5. Severely enlarged right ventricular cavity size, with wall thickness, and borderline reduced systolic function.   6. RV systolic pressure is 59mmHg (moderately elevated). Given the elevated pulmonic gradients of 55mmHg. The pulmonary artery pressures are likely normal.   7. 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   8. Severe pulmonic regurgitation.   9. The inferior vena cava is dilated measuring 3.01 cm in diameter, (dilated >2.1cm) with abnormal inspiratory collapse (abnormal <50%) consistent with elevated right atrial pressure (~15, range 10-20mmHg).  10. No pericardial effusion seen.  11. A bioprosthetic valve replacement is present in the aortic position.  12. Compared to the transthoracic echocardiogram performed on 9/20/2018, The pulmonic valve gradients are higher and the EF is lower (previously 40%).    Assessment and plan  ---------------------------   47 y/o male, with PMHx of CAD-s/p DESx2 LAD 2016 (in [facility], by Dr Dumont), Rheumatic fever-S/P MV repair, S/P Bio AVR, CHF, presents to the ER with complaint of b/l leg swelling L>R. states has been present for a year. states swelling comes and goes. states recently noticed swelling present for longer period of time and not resolving. denies f/n/v/d, CP, SOB, HA, dizziness, abdominal pain, urinary symptoms, numbness, tingling, fall.  chf systolic acute on chronic  / le edema with hx of ischemic cardiomyopathy  tele noted  TTE today  continue all cardiac meds, non compliant to meds  awaiting doppler results r/o dvt, negative  follow up renal function, awaiting labs  tsh  lasix 20 mg iv bid  will need R/L heart cath/ possible CHAD to asses PS and aortic valve  continue diuresis  will add Farxiga up[on dc  will increase entresto as tolerated  uo, negative 4.5 liter  plan for R/L heart cath tomorrow , plan for CHAD ? Monday  continue Diuresis    	        
Date of Service: 03-12-24 @ 08:47           CARDIOLOGY     PROGRESS  NOTE   ________________________________________________    CHIEF COMPLAINT:Patient is a 48y old  Male who presents with a chief complaint of edema/ chf (11 Mar 2024 23:30)  doing better  	  REVIEW OF SYSTEMS:  CONSTITUTIONAL: No fever, weight loss, or fatigue  EYES: No eye pain, visual disturbances, or discharge  ENT:  No difficulty hearing, tinnitus, vertigo; No sinus or throat pain  NECK: No pain or stiffness  RESPIRATORY: No cough, wheezing, chills or hemoptysis; decrease Shortness of Breath  CARDIOVASCULAR: No chest pain, palpitations, passing out, dizziness, or leg swelling  GASTROINTESTINAL: No abdominal or epigastric pain. No nausea, vomiting, or hematemesis; No diarrhea or constipation. No melena or hematochezia.  GENITOURINARY: No dysuria, frequency, hematuria, or incontinence  NEUROLOGICAL: No headaches, memory loss, loss of strength, numbness, or tremors  SKIN: No itching, burning, rashes, or lesions   LYMPH Nodes: No enlarged glands  ENDOCRINE: No heat or cold intolerance; No hair loss  MUSCULOSKELETAL: No joint pain or swelling; No muscle, back, or extremity pain  PSYCHIATRIC: No depression, anxiety, mood swings, or difficulty sleeping  HEME/LYMPH: No easy bruising, or bleeding gums  ALLERGY AND IMMUNOLOGIC: No hives or eczema	    [x ] All others negative	  [ ] Unable to obtain    PHYSICAL EXAM:  T(C): 36.4 (03-12-24 @ 05:17), Max: 36.9 (03-11-24 @ 21:25)  HR: 65 (03-12-24 @ 05:17) (63 - 76)  BP: 131/82 (03-12-24 @ 05:17) (131/82 - 156/93)  RR: 18 (03-12-24 @ 05:17) (16 - 18)  SpO2: 97% (03-12-24 @ 05:17) (94% - 97%)  Wt(kg): --  I&O's Summary      Appearance: Normal	  HEENT:   Normal oral mucosa, PERRL, EOMI	  Lymphatic: No lymphadenopathy  Cardiovascular: Normal S1 S2, No JVD,+murmurs, +3 edema  Respiratory: rhonchi  Psychiatry: A & O x 3, Mood & affect appropriate  Gastrointestinal:  Soft, Non-tender, + BS	  Skin: No rashes, No ecchymoses, No cyanosis	  Neurologic: Non-focal  Extremities: Normal range of motion, No clubbing, cyanosis + 3 edema  Vascular: Peripheral pulses palpable 2+ bilaterally    MEDICATIONS  (STANDING):  aspirin enteric coated 81 milliGRAM(s) Oral daily  atorvastatin 20 milliGRAM(s) Oral at bedtime  carvedilol 6.25 milliGRAM(s) Oral every 12 hours  pantoprazole    Tablet 40 milliGRAM(s) Oral before breakfast  sacubitril 49 mG/valsartan 51 mG 1 Tablet(s) Oral two times a day      TELEMETRY: 	    ECG:  	  RADIOLOGY:  OTHER: 	  	  LABS:	 	    CARDIAC MARKERS:                            12.7   4.15  )-----------( 195      ( 11 Mar 2024 23:13 )             39.9     03-11    139  |  105  |  21  ----------------------------<  122<H>  4.0   |  24  |  0.80    Ca    9.3      11 Mar 2024 23:13    TPro  6.9  /  Alb  3.9  /  TBili  1.1  /  DBili  x   /  AST  30  /  ALT  30  /  AlkPhos  197<H>  03-11    proBNP:   Lipid Profile:   HgA1c:   TSH:     < from: VA Duplex Lower Ext Vein Scan, Bilat (03.11.24 @ 23:52) >  RIGHT:  Normal compressibility of the RIGHT common femoral, femoral and popliteal   veins.  Doppler examination shows normal spontaneous and phasic flow.  No RIGHT calf vein thrombosis is detected.    LEFT:  Normal compressibility of the LEFT common femoral, femoral and popliteal   veins.  Doppler examination shows normal spontaneous and phasic flow.  No LEFT calf vein thrombosis is detected.    IMPRESSION:  No evidence of deep venous thrombosis in either lower extremity.      Assessment and plan  ---------------------------   47 y/o male, with PMHx of CAD-s/p DESx2 LAD 2016 (in [facility], by Dr Dumont), Rheumatic fever-S/P MV repair, S/P Bio AVR, CHF, presents to the ER with complaint of b/l leg swelling L>R. states has been present for a year. states swelling comes and goes. states recently noticed swelling present for longer period of time and not resolving. denies f/n/v/d, CP, SOB, HA, dizziness, abdominal pain, urinary symptoms, numbness, tingling, fall.  chf systolic acute on chronic  / le edema with hx of ischemic cardiomyopathy  tele noted  TTE today  continue all cardiac meds, non compliant to meds  awaiting doppler results r/o dvt, negative  follow up renal function, awaiting labs  tsh  lasix 20 mg iv bid  may need R/L heart cath  	        
Date of Service: 03-13-24 @ 05:40           CARDIOLOGY     PROGRESS  NOTE   ________________________________________________    CHIEF COMPLAINT:Patient is a 48y old  Male who presents with a chief complaint of edema/ chf (12 Mar 2024 08:47)  doing better  	  REVIEW OF SYSTEMS:  CONSTITUTIONAL: No fever, weight loss, or fatigue  EYES: No eye pain, visual disturbances, or discharge  ENT:  No difficulty hearing, tinnitus, vertigo; No sinus or throat pain  NECK: No pain or stiffness  RESPIRATORY: No cough, wheezing, chills or hemoptysis; + Shortness of Breath  CARDIOVASCULAR: No chest pain, palpitations, passing out, dizziness, or leg swelling  GASTROINTESTINAL: No abdominal or epigastric pain. No nausea, vomiting, or hematemesis; No diarrhea or constipation. No melena or hematochezia.  GENITOURINARY: No dysuria, frequency, hematuria, or incontinence  NEUROLOGICAL: No headaches, memory loss, loss of strength, numbness, or tremors  SKIN: No itching, burning, rashes, or lesions   LYMPH Nodes: No enlarged glands  ENDOCRINE: No heat or cold intolerance; No hair loss  MUSCULOSKELETAL: No joint pain or swelling; No muscle, back, or extremity pain  PSYCHIATRIC: No depression, anxiety, mood swings, or difficulty sleeping  HEME/LYMPH: No easy bruising, or bleeding gums  ALLERGY AND IMMUNOLOGIC: No hives or eczema	    [x ] All others negative	  [ ] Unable to obtain    PHYSICAL EXAM:  T(C): 36.8 (03-13-24 @ 05:07), Max: 36.8 (03-12-24 @ 09:37)  HR: 61 (03-13-24 @ 05:07) (61 - 66)  BP: 130/76 (03-13-24 @ 05:07) (113/60 - 143/88)  RR: 18 (03-13-24 @ 05:07) (18 - 18)  SpO2: 96% (03-13-24 @ 05:07) (94% - 97%)  Wt(kg): --  I&O's Summary    12 Mar 2024 07:01  -  13 Mar 2024 05:40  --------------------------------------------------------  IN: 240 mL / OUT: 1750 mL / NET: -1510 mL        Appearance: Normal	  HEENT:   Normal oral mucosa, PERRL, EOMI	  Lymphatic: No lymphadenopathy  Cardiovascular: Normal S1 S2, No JVD, + murmurs, +edema  Respiratory: decrease bs  Psychiatry: A & O x 3, Mood & affect appropriate  Gastrointestinal:  Soft, Non-tender, + BS	  Skin: No rashes, No ecchymoses, No cyanosis	  Neurologic: Non-focal  Extremities: Normal range of motion, No clubbing, cyanosis + edema  Vascular: Peripheral pulses palpable 2+ bilaterally    MEDICATIONS  (STANDING):  aspirin enteric coated 81 milliGRAM(s) Oral daily  atorvastatin 20 milliGRAM(s) Oral at bedtime  carvedilol 6.25 milliGRAM(s) Oral every 12 hours  furosemide   Injectable 20 milliGRAM(s) IV Push two times a day  pantoprazole    Tablet 40 milliGRAM(s) Oral before breakfast  sacubitril 49 mG/valsartan 51 mG 1 Tablet(s) Oral two times a day      TELEMETRY: 	    ECG:  	  RADIOLOGY:  OTHER: 	  	  LABS:	 	    CARDIAC MARKERS:                          12.7   4.15  )-----------( 195      ( 11 Mar 2024 23:13 )             39.9     03-11    139  |  105  |  21  ----------------------------<  122<H>  4.0   |  24  |  0.80    Ca    9.3      11 Mar 2024 23:13    TPro  6.9  /  Alb  3.9  /  TBili  1.1  /  DBili  x   /  AST  30  /  ALT  30  /  AlkPhos  197<H>  03-11    proBNP:   Lipid Profile:   HgA1c:   TSH:     < from: TTE W or WO Ultrasound Enhancing Agent (03.12.24 @ 12:40) >   1. Left ventricular cavity is mildly dilated. Left ventricular wall thickness is normal. Left ventricular systolic function is severely decreased with an ejection fraction of 27 % by Live's method of disks. Global left ventricular hypokinesis.   2. There is moderate (grade 2) left ventricular diastolic dysfunction, with elevated filling pressure.   3. The left atrium is moderately dilated.   4. The right atrium is severely dilated.   5. Severely enlarged right ventricular cavity size, with wall thickness, and borderline reduced systolic function.   6. RV systolic pressure is 59mmHg (moderately elevated). Given the elevated pulmonic gradients of 55mmHg. The pulmonary artery pressures are likely normal.   7. 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   8. Severe pulmonic regurgitation.   9. The inferior vena cava is dilated measuring 3.01 cm in diameter, (dilated >2.1cm) with abnormal inspiratory collapse (abnormal <50%) consistent with elevated right atrial pressure (~15, range 10-20mmHg).  10. No pericardial effusion seen.  11. A bioprosthetic valve replacement is present in the aortic position.  12. Compared to the transthoracic echocardiogram performed on 9/20/2018, The pulmonic valve gradients are higher and the EF is lower (previously 40%).    Assessment and plan  ---------------------------   47 y/o male, with PMHx of CAD-s/p DESx2 LAD 2016 (in [facility], by Dr Dumont), Rheumatic fever-S/P MV repair, S/P Bio AVR, CHF, presents to the ER with complaint of b/l leg swelling L>R. states has been present for a year. states swelling comes and goes. states recently noticed swelling present for longer period of time and not resolving. denies f/n/v/d, CP, SOB, HA, dizziness, abdominal pain, urinary symptoms, numbness, tingling, fall.  chf systolic acute on chronic  / le edema with hx of ischemic cardiomyopathy  tele noted  TTE today  continue all cardiac meds, non compliant to meds  awaiting doppler results r/o dvt, negative  follow up renal function, awaiting labs  tsh  lasix 20 mg iv bid  will need R/L heart cath/ possible CHAD to asses PS and aortic valve  continue diuresis  will add Farxiga up[on dc  will increase entresto as tolerated    	    	        
Subjective  No acute events reported overnight.  The patient is currently sitting at the edge of the bed talking to a family member.  He reports that he feels he is back to his baseline status.  Denies any chest pain/tightness,, fevers, chills, sweats, light sensation, dizziness or palpitations.  No abdominal pain is noted.    Review of Systems  14 point review of systems and physical examination as noted above.    Physical examination  No apparent distress, alert and orient x 3, appropriate affect  JVD is not elevated, supple, no lymphadenopathy  Clear to auscultation bilaterally no wheezing rhonchi crackles  Regular rate and rhythm with 2 out of 6 early systolic decrescendo murmur at left upper sternal border with 2 out of 6 midsystolic high-pitched crescendo decrescendo murmur at left upper sternal border with no rub or gallop  Positive bowel sound, soft, nontender, nondistended, no mass and guarding or rebound  No clubbing cyanosis or edema moving all extremities spontaneously  2+ DP/PT pulse  No focal deficits    Assessment/plan  49 y/o male, with PMHx of CAD-s/p DESx2 LAD 2016 (in [facility], by Dr Dumont), Rheumatic fever-S/P MV repair, S/P Bio AVR, CHF, presents to the ER with complaint of b/l leg swelling L>R. states has been present for a year. states swelling comes and goes. states recently noticed swelling present for longer period of time and not resolving. denies f/n/v/d, CP, SOB, HA, dizziness, abdominal pain, urinary symptoms, numbness, tingling, fall.    Pulmonary valve regurgitation/stenosis    --Discussed with the patient findings on imaging studies and cardiac catheterization that demonstrates significant pulmonary valve regurgitation/stenosis.  Per the patient he had a Ross procedure at [facility] approximately 20 years ago.  He now presents with acute on chronic diastolic heart failure.  --Discussed with the patient what is pulmonic valve regurgitation/stenosis.  The natural pathophysiology of left untreated was reviewed.  The associated signs and symptoms were discussed.  --The patient is being assessed now by multidisciplinary heart valve team to determine potential treatment options.  Explained to the patient potential different treatment options along with their pros/cons and risk/benefits of medical therapy, percutaneous transcatheter pulmonary valve implantation and surgery.    -- Due to incorrectly protocoled heart CTA a repeat heart CTA will be performed.  This was discussed with Dr. Patti Uriarte.    --Continue  aspirin enteric coated 81 mg daily, atorvastatin 20 mg daily, carvedilol 6.25 mg every 12 hours, furosemide 40 mg daily, sacubitril 49 mG/valsartan 51 mG 1 Tablet(s) Oral two times a day and spironolactone 25 milliGRAM(s) Oral daily.  --Continue telemetry monitoring.    Patient imaging studies were reviewed by the heart valve team.  The patient's anatomy is amenable based upon preliminary assessment for percutaneous transcatheter pulmonary valve implantation.  This was explained to the patient and his family member.  Patient is to be discharged later today with plan to close follow-up with congential interventional cardiologist/Dr. Hammond.    All questions and concerns of the patient and his family were addressed.    Findings and plan were discussed with interventional cardiology/Dr. Hammond, cardiology/Dr. Canada and cardiac surgery/Dr. Brown.

## 2024-03-19 NOTE — CHART NOTE - NSCHARTNOTEFT_GEN_A_CORE
Request from Dr. Canada to facilitate patient discharge. Medication reconciliation reviewed, revised, and resolved with Dr. Canada who has medically cleared patient for discharge with follow up as advised. Please refer to discharge note for detailed hospital course. As per Dr. Canada pt will receive a call from Dr. Lee's office regarding her valve work up. Pt educated on discharge medications and follow-ups.    Yoanna Mckeon NP  [phone]

## 2024-03-19 NOTE — DISCHARGE NOTE PROVIDER - CARE PROVIDER_API CALL
Beverly Canada  Cardiovascular Disease  [address]  [place] [address]  Phone: [phone]  Fax: [phone]  Follow Up Time:     Rhett Lee  Interventional Cardiology  [address], [address] [place] [id number]  Phone: [phone]  Fax: [phone]  Follow Up Time: 1 week

## 2024-03-19 NOTE — PROGRESS NOTE ADULT - PROVIDER SPECIALTY LIST ADULT
Cardiology
Cardiology
Structural Heart
Cardiology

## 2024-03-19 NOTE — DISCHARGE NOTE NURSING/CASE MANAGEMENT/SOCIAL WORK - PATIENT PORTAL LINK FT
You can access the FollowMyHealth Patient Portal offered by [facility] by registering at the following website: [email or web address]. By joining [facility]’s FollowMyHealth portal, you will also be able to view your health information using other applications (apps) compatible with our system.

## 2024-03-19 NOTE — DISCHARGE NOTE PROVIDER - NSDCCPCAREPLAN_GEN_ALL_CORE_FT
PRINCIPAL DISCHARGE DIAGNOSIS  Diagnosis: Leg swelling  Assessment and Plan of Treatment: Secondary to patient having congestive Heart Failure.   Weigh yourself daily.  If you gain 3lbs in 3 days, or 5lbs in a week call your Health Care Provider.  Do not eat or drink foods containing more than 2000mg of salt (sodium) in your diet every day.  Call your Health Care Provider if you have any swelling or increased swelling in your feet, ankles, and/or stomach.  Take all of your medication as directed.  If you become dizzy call your Health Care Provider.        SECONDARY DISCHARGE DIAGNOSES  Diagnosis: Personal history of pulmonic valve disease  Assessment and Plan of Treatment: Please follow up with Dr. Lee, as his team may also contact you regarding your pulmonic valve work up.

## 2024-04-11 ENCOUNTER — APPOINTMENT (OUTPATIENT)
Dept: CARDIOLOGY | Facility: CLINIC | Age: 49
End: 2024-04-11

## 2024-04-29 ENCOUNTER — RESULT CHARGE (OUTPATIENT)
Age: 49
End: 2024-04-29

## 2024-05-01 ENCOUNTER — APPOINTMENT (OUTPATIENT)
Dept: PEDIATRIC CARDIOLOGY | Facility: CLINIC | Age: 49
End: 2024-05-01
Payer: MEDICAID

## 2024-05-01 VITALS
BODY MASS INDEX: 39.67 KG/M2 | HEART RATE: 55 BPM | OXYGEN SATURATION: 98 % | DIASTOLIC BLOOD PRESSURE: 78 MMHG | SYSTOLIC BLOOD PRESSURE: 120 MMHG | WEIGHT: 264.78 LBS | HEIGHT: 68.31 IN

## 2024-05-01 DIAGNOSIS — Z29.89 ENCOUNTER. FOR OTHER SPECIFIED PROPHYLACTIC MEASURES: ICD-10-CM

## 2024-05-01 PROCEDURE — 93320 DOPPLER ECHO COMPLETE: CPT

## 2024-05-01 PROCEDURE — 93000 ELECTROCARDIOGRAM COMPLETE: CPT

## 2024-05-01 PROCEDURE — 93303 ECHO TRANSTHORACIC: CPT

## 2024-05-01 PROCEDURE — 93325 DOPPLER ECHO COLOR FLOW MAPG: CPT

## 2024-05-01 PROCEDURE — 99205 OFFICE O/P NEW HI 60 MIN: CPT | Mod: 25

## 2024-05-01 RX ORDER — SPIRONOLACTONE 25 MG/1
25 TABLET ORAL
Refills: 0 | Status: ACTIVE | COMMUNITY

## 2024-05-01 RX ORDER — FUROSEMIDE 40 MG/1
40 TABLET ORAL
Refills: 0 | Status: ACTIVE | COMMUNITY

## 2024-05-01 RX ORDER — CARVEDILOL 6.25 MG/1
6.25 TABLET, FILM COATED ORAL
Refills: 0 | Status: ACTIVE | COMMUNITY

## 2024-05-01 RX ORDER — SACUBITRIL AND VALSARTAN 49; 51 MG/1; MG/1
49-51 TABLET, FILM COATED ORAL
Refills: 0 | Status: ACTIVE | COMMUNITY

## 2024-05-01 NOTE — REASON FOR VISIT
[Initial Consultation] : an initial consultation for [Pulmonary Stenosis] : pulmonary stenosis [Patient] : patient

## 2024-05-02 NOTE — REVIEW OF SYSTEMS
[Edema] : edema [Feeling Poorly] : not feeling poorly (malaise) [Fever] : no fever [Wgt Loss (___ Lbs)] : no recent weight loss [Pallor] : not pale [Eye Discharge] : no eye discharge [Redness] : no redness [Change in Vision] : no change in vision [Nasal Stuffiness] : no nasal congestion [Sore Throat] : no sore throat [Earache] : no earache [Loss Of Hearing] : no hearing loss [Cyanosis] : no cyanosis [Diaphoresis] : not diaphoretic [Chest Pain] : no chest pain or discomfort [Exercise Intolerance] : no persistence of exercise intolerance [Palpitations] : no palpitations [Orthopnea] : no orthopnea [Fast HR] : no tachycardia [Tachypnea] : not tachypneic [Wheezing] : no wheezing [Cough] : no cough [Shortness Of Breath] : not expressed as feeling short of breath [Vomiting] : no vomiting [Diarrhea] : no diarrhea [Abdominal Pain] : no abdominal pain [Decrease In Appetite] : appetite not decreased [Fainting (Syncope)] : no fainting [Seizure] : no seizures [Headache] : no headache [Dizziness] : no dizziness [Limping] : no limping [Joint Pains] : no arthralgias [Joint Swelling] : no joint swelling [Rash] : no rash [Wound problems] : no wound problems [Easy Bruising] : no tendency for easy bruising [Swollen Glands] : no lymphadenopathy [Easy Bleeding] : no ~M tendency for easy bleeding [Nosebleeds] : no epistaxis [Sleep Disturbances] : ~T no sleep disturbances [Hyperactive] : no hyperactive behavior [Depression] : no depression [Anxiety] : no anxiety [Failure To Thrive] : no failure to thrive [Short Stature] : short stature was not noted [Jitteriness] : no jitteriness [Heat/Cold Intolerance] : no temperature intolerance [Dec Urine Output] : no oliguria

## 2024-05-02 NOTE — CONSULT LETTER
[Today's Date] : [unfilled] [Name] : Name: [unfilled] [] : : ~~ [Today's Date:] : [unfilled] [Consult] : I had the pleasure of evaluating your patient, [unfilled]. My full evaluation follows. [Consult - Single Provider] : Thank you very much for allowing me to participate in the care of this patient. If you have any questions, please do not hesitate to contact me. [Sincerely,] : Sincerely, [Dear  ___:] : Dear Dr. [unfilled]: [FreeTextEntry4] : Beverly Canada MD [FreeTextEntry5] : [address]  [FreeTextEntry6] : [place] [address] [de-identified] : See note for my assessment. [de-identified] : Zachery Rao MD, MS Congenital Interventional Cardiologist Director of Pediatric Cardiac Catheterization [facility]  of Pediatrics, [facility] [employer] [facility] at [facility] [place] [address] Tel: [phone] Fax: [phone]  [email or web address] [DrRosita  ___] : Dr. COBIAN

## 2024-05-02 NOTE — PHYSICAL EXAM
[General Appearance - Alert] : alert [General Appearance - In No Acute Distress] : in no acute distress [General Appearance - Well Nourished] : well nourished [General Appearance - Well Developed] : well developed [General Appearance - Well-Appearing] : well appearing [Appearance Of Head] : the head was normocephalic [Facies] : there were no dysmorphic facial features [Sclera] : the conjunctiva were normal [Outer Ear] : the ears and nose were normal in appearance [Examination Of The Oral Cavity] : mucous membranes were moist and pink [Auscultation Breath Sounds / Voice Sounds] : breath sounds clear to auscultation bilaterally [Normal Chest Appearance] : the chest was normal in appearance [Apical Impulse] : quiet precordium with normal apical impulse [Heart Rate And Rhythm] : normal heart rate and rhythm [Heart Sounds] : normal S1 and S2 [Heart Sounds Gallop] : no gallops [Heart Sounds Pericardial Friction Rub] : no pericardial rub [Edema] : no edema [Arterial Pulses] : normal upper and lower extremity pulses with no pulse delay [Heart Sounds Click] : no clicks [Capillary Refill Test] : normal capillary refill [Systolic] : systolic [III] : a grade 3/6   [Ejection] : ejection [Harsh] : harsh [Bowel Sounds] : normal bowel sounds [Abdomen Soft] : soft [Nondistended] : nondistended [Abdomen Tenderness] : non-tender [Nail Clubbing] : no clubbing  or cyanosis of the fingers [Motor Tone] : normal muscle strength and tone [] : no rash [Skin Lesions] : no lesions [Skin Turgor] : normal turgor [Demonstrated Behavior - Infant Nonreactive To Parents] : interactive [Mood] : mood and affect were appropriate for age [Demonstrated Behavior] : normal behavior [Diastolic] : diastolic [II] : a grade 2/4

## 2024-05-02 NOTE — CARDIOLOGY SUMMARY
[Today's Date] : [unfilled] [FreeTextEntry1] : Sinus bradycardia at a rate of 55 beats per minute with a normal FL interval.  There is LAD and incomplete LBBB.  There is no evidence of atrial enlargement, ventricular hypertrophy or pre-excitation.  There are non-specific T-wave changes. [FreeTextEntry2] : 1. Pulmonary valve stenosis. 2. Peak pulmonary valve gradient = 70.9 mmHg (mean grad = 44.0 mmHg). 3. At least moderate pulmonary regurgitation. 4. There is a narrowed RVOT and main pulmonary artery. Imaging of the main and branch pulmonary arteries was limited. 5. Limited imaging of right ventricle. Overall likely mildly decreased RV systolic function. 6. S/p aortic valve replacement by report. 7. No evidence of aortic valve stenosis. 8. Trivial aortic valve regurgitation. 9. Overall, left ventricular systolic function appears mildly decreased mostly due to interventricular septal dyskinesia and paradoxical motion. 10. No pericardial effusion. 11. This was a technically difficult and limited study. Please refer to the body of the report for structures not imaged adequately. [de-identified] : CT scan from recent [facility] admission reviewed.

## 2024-05-02 NOTE — DISCUSSION/SUMMARY
[FreeTextEntry1] : PROBLEM LIST: 1. CAD s/p YOLANDA x 2 in LAD ([facility], [place]). 2. Rheumatic Carditis.    a. s/p MV repair and probably Ross procedure ([facility], ~20 years ago, records unavailable).    b. will work on obtaining surgical records if available. 3. Significant RVOT obstruction and regurgitation. 4. Right heart failure (LE edema).   I spoke with Mr. Ayers as a pre-procedural consult, prior to considering MASOOD for a procedure with me in the catheterization laboratory.  MASOOD has a history of rheumatic carditis having undergone surgical repair 20 years ago at an outside institution that likely consisted of mitral valve repair and Ross procedure.  The records of this are unavailable but we will try and obtain them.  Recent evaluation has revealed significant RVOT/?conduit obstruction and regurgitation prompting referral for consideration of conduit rehabilitation and transcatheter pulmonary valve replacement (TPVR).   I have reviewed the relevant pre-procedural imaging.  I discussed the technical aspects of the procedure, including intraprocedural decision making, as well as the risks and benefits of TPVR.  Further, I briefly touched on those associated with surgical pulmonary valve replacement (PVR).  I specifically addressed the procedural risks as well as early and late-term risks of stent fracture, endocarditis and valvar dysfunction.  I believe he is at low risk for coronary compression however we will certainly assess this during the procedure.  We discussed the current TPVR options including the Medtronic Brenda valve and Almeida Aria S3 valves.  I even mentioned the possibility of repeat TPVR (so-called valve-in-valve therapy) if/when necessary in the future.  I addressed all questions asked.   MASOOD wishes to proceed with TPVR in the cath lab.  We will try to get this done in late May/early March.  In the interim, if there are any further questions, concerns or changes in his clinical status we would be happy to see him at that time.  The patient and family were instructed to return if MASOOD develops chest pain, palpitations, cyanosis, respiratory distress, exercise intolerance, syncope or any other concerning symptoms.  He does not require SBE prophylaxis or activity limitations.  The family expressed understanding of and agreement with the plan.  All questions were answered.   Thank you for allowing us to participate in the care of this patient.  Feel free to reach out to us with any further questions or concerns.

## 2024-05-03 PROBLEM — Z29.89 NEED FOR SBE (SUBACUTE BACTERIAL ENDOCARDITIS) PROPHYLAXIS: Status: ACTIVE | Noted: 2024-05-03

## 2024-05-05 RX ORDER — AMOXICILLIN 500 MG/1
500 TABLET, FILM COATED ORAL
Qty: 4 | Refills: 1 | Status: ACTIVE | COMMUNITY
Start: 2024-05-03 | End: 1900-01-01

## 2024-05-30 ENCOUNTER — OUTPATIENT (OUTPATIENT)
Dept: OUTPATIENT SERVICES | Facility: HOSPITAL | Age: 49
LOS: 1 days | End: 2024-05-30

## 2024-05-30 VITALS
DIASTOLIC BLOOD PRESSURE: 72 MMHG | OXYGEN SATURATION: 98 % | HEIGHT: 68 IN | SYSTOLIC BLOOD PRESSURE: 115 MMHG | RESPIRATION RATE: 16 BRPM | HEART RATE: 50 BPM | TEMPERATURE: 98 F | WEIGHT: 265 LBS

## 2024-05-30 DIAGNOSIS — Z95.2 PRESENCE OF PROSTHETIC HEART VALVE: Chronic | ICD-10-CM

## 2024-05-30 DIAGNOSIS — I37.1 NONRHEUMATIC PULMONARY VALVE INSUFFICIENCY: ICD-10-CM

## 2024-05-30 DIAGNOSIS — I37.0 NONRHEUMATIC PULMONARY VALVE STENOSIS: ICD-10-CM

## 2024-05-30 DIAGNOSIS — G47.33 OBSTRUCTIVE SLEEP APNEA (ADULT) (PEDIATRIC): ICD-10-CM

## 2024-05-30 DIAGNOSIS — Z95.5 PRESENCE OF CORONARY ANGIOPLASTY IMPLANT AND GRAFT: Chronic | ICD-10-CM

## 2024-05-30 LAB
ANION GAP SERPL CALC-SCNC: 13 MMOL/L — SIGNIFICANT CHANGE UP (ref 7–14)
BLD GP AB SCN SERPL QL: NEGATIVE — SIGNIFICANT CHANGE UP
BUN SERPL-MCNC: 17 MG/DL — SIGNIFICANT CHANGE UP (ref 7–23)
CALCIUM SERPL-MCNC: 9.6 MG/DL — SIGNIFICANT CHANGE UP (ref 8.4–10.5)
CHLORIDE SERPL-SCNC: 101 MMOL/L — SIGNIFICANT CHANGE UP (ref 98–107)
CO2 SERPL-SCNC: 23 MMOL/L — SIGNIFICANT CHANGE UP (ref 22–31)
CREAT SERPL-MCNC: 0.78 MG/DL — SIGNIFICANT CHANGE UP (ref 0.5–1.3)
EGFR: 111 ML/MIN/1.73M2 — SIGNIFICANT CHANGE UP
GLUCOSE SERPL-MCNC: 149 MG/DL — HIGH (ref 70–99)
HCT VFR BLD CALC: 42.5 % — SIGNIFICANT CHANGE UP (ref 39–50)
HGB BLD-MCNC: 14.7 G/DL — SIGNIFICANT CHANGE UP (ref 13–17)
MCHC RBC-ENTMCNC: 29.7 PG — SIGNIFICANT CHANGE UP (ref 27–34)
MCHC RBC-ENTMCNC: 34.6 GM/DL — SIGNIFICANT CHANGE UP (ref 32–36)
MCV RBC AUTO: 85.9 FL — SIGNIFICANT CHANGE UP (ref 80–100)
NRBC # BLD: 0 /100 WBCS — SIGNIFICANT CHANGE UP (ref 0–0)
NRBC # FLD: 0 K/UL — SIGNIFICANT CHANGE UP (ref 0–0)
PLATELET # BLD AUTO: 188 K/UL — SIGNIFICANT CHANGE UP (ref 150–400)
POTASSIUM SERPL-MCNC: 4.1 MMOL/L — SIGNIFICANT CHANGE UP (ref 3.5–5.3)
POTASSIUM SERPL-SCNC: 4.1 MMOL/L — SIGNIFICANT CHANGE UP (ref 3.5–5.3)
RBC # BLD: 4.95 M/UL — SIGNIFICANT CHANGE UP (ref 4.2–5.8)
RBC # FLD: 13.5 % — SIGNIFICANT CHANGE UP (ref 10.3–14.5)
RH IG SCN BLD-IMP: POSITIVE — SIGNIFICANT CHANGE UP
RH IG SCN BLD-IMP: POSITIVE — SIGNIFICANT CHANGE UP
SODIUM SERPL-SCNC: 137 MMOL/L — SIGNIFICANT CHANGE UP (ref 135–145)
WBC # BLD: 4.15 K/UL — SIGNIFICANT CHANGE UP (ref 3.8–10.5)
WBC # FLD AUTO: 4.15 K/UL — SIGNIFICANT CHANGE UP (ref 3.8–10.5)

## 2024-05-30 NOTE — H&P PST ADULT - LAST CARDIAC ANGIOGRAM/IMAGING
recent cath 3/14/2024 at [facility]- Patent LAD stent. Luminal irregularities of coronary arteries, Severely elevated gradient between RV and PA. Likely pulmonic stenosis. stent to LADX2 placed in 2016

## 2024-05-30 NOTE — H&P PST ADULT - NSICDXPASTMEDICALHX_GEN_ALL_CORE_FT
PAST MEDICAL HISTORY:  CAD (coronary artery disease)     Dyslipidemia     Hypertension     Nonrheumatic pulmonary valve insufficiency     Nonrheumatic pulmonary valve stenosis     Rheumatic fever     Stented coronary artery

## 2024-05-30 NOTE — H&P PST ADULT - PEDAL EDEMA SEVERITY
This was refilled by [facility] on 4/18/18 for a year:    methocarbamol (ROBAXIN) 750 MG tablet 120 tablet 11 4/18/2018  No   Sig: TAKE 1 TO 2 TABLETS BY MOUTH UP TO THREE TIMES DAILY(4TH TIME PER DAY ON OCCASION IS OK)   Class: E-Prescribe   Order: [id number]   E-Prescribing Status: Receipt confirmed by pharmacy (4/18/2018  7:53 AM CDT)        1+

## 2024-05-30 NOTE — H&P PST ADULT - HISTORY OF PRESENT ILLNESS
48 yr old male with PMH of rheumatic fever, HTN, HLD, CAD s/p stent to LADx2 (2016), s/p Mitral valve repair and aortic valve repair (1995)  presents to PST for preop evaluation with diagnosis of nonrheumatic pulmonary valve stenosis. Pt reports he was hospitalized at [facility] in March 2024 with increased LLE swelling and found to have significant RVOT obstruction and regurgitation. Pt denies dyspnea, chest pain, palpitation or orthopnea. Patient is scheduled for cardiac catheretization on 6/4/2024.

## 2024-05-30 NOTE — H&P PST ADULT - LAST ECHOCARDIOGRAM
5/1/2024- Pulmonary valve stenosis, At least moderate pulmonary regurgitation. There is a narrowed RVOT and main pulmonary artery. Imaging of the main and branch pulmonary arteries was limited. Overall, left ventricular systolic function appears mildly decreased mostly due to interventricular septal dyskinesia and paradoxical motion..

## 2024-05-30 NOTE — H&P PST ADULT - CARDIOVASCULAR COMMENTS
Pre op dx: Nonrheumatic pulmonary valve stenosis See HPI, Hx Rheumatic fever at 10 years old affecting heart valves, s/p AVR and MVR in 1995. S/P stent to LAD x2 in 2016 due to CAD.

## 2024-05-30 NOTE — H&P PST ADULT - FUNCTIONAL STATUS
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

## 2024-05-30 NOTE — H&P PST ADULT - PROBLEM SELECTOR PLAN 1
Patient is scheduled for cardiac catheretization on 6/4/2024. Preop teaching done, written and verbal instructions given, with teach back, pt able to verbalize understanding. Pt will take own Pepcid on the morning of procedure for GI prophylaxis.     -CBC, BMP, T/S and ABO done at [facility].  -EKG and Echo in chart.   -Cath report in sunrise    -Patient instructed to take Carvedilol and Entresto with a sip of water on the morning of procedure.  -Instructed to continue Aspirin pre op (stented coronary artery).  -Patient instructed to hold Furosemide and Spironolactone on the morning of procedure. Pt verbalized understanding.

## 2024-05-30 NOTE — H&P PST ADULT - NSANTHOSAYNRD_GEN_A_CORE
No. SYBIL screening performed.  STOP BANG Legend: 0-2 = LOW Risk; 3-4 = INTERMEDIATE Risk; 5-8 = HIGH Risk

## 2024-06-03 ENCOUNTER — NON-APPOINTMENT (OUTPATIENT)
Age: 49
End: 2024-06-03

## 2024-06-04 ENCOUNTER — INPATIENT (INPATIENT)
Facility: HOSPITAL | Age: 49
LOS: 0 days | Discharge: HOME | End: 2024-06-05
Attending: STUDENT IN AN ORGANIZED HEALTH CARE EDUCATION/TRAINING PROGRAM | Admitting: STUDENT IN AN ORGANIZED HEALTH CARE EDUCATION/TRAINING PROGRAM
Payer: MEDICAID

## 2024-06-04 VITALS
RESPIRATION RATE: 16 BRPM | OXYGEN SATURATION: 97 % | DIASTOLIC BLOOD PRESSURE: 63 MMHG | WEIGHT: 259.93 LBS | SYSTOLIC BLOOD PRESSURE: 115 MMHG | TEMPERATURE: 98 F | HEART RATE: 59 BPM | HEIGHT: 69 IN

## 2024-06-04 DIAGNOSIS — Z95.5 PRESENCE OF CORONARY ANGIOPLASTY IMPLANT AND GRAFT: Chronic | ICD-10-CM

## 2024-06-04 DIAGNOSIS — I37.0 NONRHEUMATIC PULMONARY VALVE STENOSIS: ICD-10-CM

## 2024-06-04 DIAGNOSIS — Z95.2 PRESENCE OF PROSTHETIC HEART VALVE: Chronic | ICD-10-CM

## 2024-06-04 LAB
ALBUMIN SERPL ELPH-MCNC: 3.8 G/DL — SIGNIFICANT CHANGE UP (ref 3.3–5)
ALP SERPL-CCNC: 119 U/L — SIGNIFICANT CHANGE UP (ref 40–120)
ALT FLD-CCNC: 32 U/L — SIGNIFICANT CHANGE UP (ref 4–41)
ANION GAP SERPL CALC-SCNC: 12 MMOL/L — SIGNIFICANT CHANGE UP (ref 7–14)
AST SERPL-CCNC: 26 U/L — SIGNIFICANT CHANGE UP (ref 4–40)
BASE EXCESS BLDA CALC-SCNC: 0.1 MMOL/L — SIGNIFICANT CHANGE UP (ref -2–3)
BASE EXCESS BLDA CALC-SCNC: 0.1 MMOL/L — SIGNIFICANT CHANGE UP (ref -2–3)
BASOPHILS # BLD AUTO: 0.03 K/UL — SIGNIFICANT CHANGE UP (ref 0–0.2)
BASOPHILS NFR BLD AUTO: 0.5 % — SIGNIFICANT CHANGE UP (ref 0–2)
BILIRUB SERPL-MCNC: 1 MG/DL — SIGNIFICANT CHANGE UP (ref 0.2–1.2)
BUN SERPL-MCNC: 16 MG/DL — SIGNIFICANT CHANGE UP (ref 7–23)
CA-I BLDA-SCNC: 1.11 MMOL/L — LOW (ref 1.15–1.33)
CA-I BLDA-SCNC: 1.11 MMOL/L — LOW (ref 1.15–1.33)
CALCIUM SERPL-MCNC: 9 MG/DL — SIGNIFICANT CHANGE UP (ref 8.4–10.5)
CHLORIDE SERPL-SCNC: 103 MMOL/L — SIGNIFICANT CHANGE UP (ref 98–107)
CO2 BLDA-SCNC: 26 MMOL/L — HIGH (ref 19–24)
CO2 BLDA-SCNC: 27 MMOL/L — HIGH (ref 19–24)
CO2 SERPL-SCNC: 21 MMOL/L — LOW (ref 22–31)
CREAT SERPL-MCNC: 0.81 MG/DL — SIGNIFICANT CHANGE UP (ref 0.5–1.3)
EGFR: 109 ML/MIN/1.73M2 — SIGNIFICANT CHANGE UP
EOSINOPHIL # BLD AUTO: 0.08 K/UL — SIGNIFICANT CHANGE UP (ref 0–0.5)
EOSINOPHIL NFR BLD AUTO: 1.3 % — SIGNIFICANT CHANGE UP (ref 0–6)
GLUCOSE BLDA-MCNC: 121 MG/DL — HIGH (ref 70–99)
GLUCOSE BLDA-MCNC: 154 MG/DL — HIGH (ref 70–99)
GLUCOSE SERPL-MCNC: 131 MG/DL — HIGH (ref 70–99)
HCO3 BLDA-SCNC: 25 MMOL/L — SIGNIFICANT CHANGE UP (ref 21–28)
HCO3 BLDA-SCNC: 25 MMOL/L — SIGNIFICANT CHANGE UP (ref 21–28)
HCT VFR BLD CALC: 39.6 % — SIGNIFICANT CHANGE UP (ref 39–50)
HCT VFR BLDA CALC: 38 % — SIGNIFICANT CHANGE UP
HCT VFR BLDA CALC: 39 % — SIGNIFICANT CHANGE UP
HCT VFR BLDA CALC: 39 % — SIGNIFICANT CHANGE UP
HGB BLD-MCNC: 13.5 G/DL — SIGNIFICANT CHANGE UP (ref 13–17)
HGB BLDA-MCNC: 12.8 G/DL — SIGNIFICANT CHANGE UP (ref 12.6–17.4)
HGB BLDA-MCNC: 12.9 G/DL — SIGNIFICANT CHANGE UP (ref 12.6–17.4)
HGB BLDA-MCNC: 13.1 G/DL — SIGNIFICANT CHANGE UP (ref 12.6–17.4)
HOROWITZ INDEX BLDA+IHG-RTO: 21 — SIGNIFICANT CHANGE UP
HOROWITZ INDEX BLDA+IHG-RTO: 21 — SIGNIFICANT CHANGE UP
HOROWITZ INDEX BLDA+IHG-RTO: 80 — SIGNIFICANT CHANGE UP
IANC: 3.79 K/UL — SIGNIFICANT CHANGE UP (ref 1.8–7.4)
IMM GRANULOCYTES NFR BLD AUTO: 0.3 % — SIGNIFICANT CHANGE UP (ref 0–0.9)
LACTATE BLDA-MCNC: 0.7 MMOL/L — SIGNIFICANT CHANGE UP (ref 0.5–2)
LACTATE BLDA-MCNC: 0.9 MMOL/L — SIGNIFICANT CHANGE UP (ref 0.5–2)
LYMPHOCYTES # BLD AUTO: 1.67 K/UL — SIGNIFICANT CHANGE UP (ref 1–3.3)
LYMPHOCYTES # BLD AUTO: 27.6 % — SIGNIFICANT CHANGE UP (ref 13–44)
MAGNESIUM SERPL-MCNC: 2 MG/DL — SIGNIFICANT CHANGE UP (ref 1.6–2.6)
MCHC RBC-ENTMCNC: 29.8 PG — SIGNIFICANT CHANGE UP (ref 27–34)
MCHC RBC-ENTMCNC: 34.1 GM/DL — SIGNIFICANT CHANGE UP (ref 32–36)
MCV RBC AUTO: 87.4 FL — SIGNIFICANT CHANGE UP (ref 80–100)
MONOCYTES # BLD AUTO: 0.46 K/UL — SIGNIFICANT CHANGE UP (ref 0–0.9)
MONOCYTES NFR BLD AUTO: 7.6 % — SIGNIFICANT CHANGE UP (ref 2–14)
NEUTROPHILS # BLD AUTO: 3.79 K/UL — SIGNIFICANT CHANGE UP (ref 1.8–7.4)
NEUTROPHILS NFR BLD AUTO: 62.7 % — SIGNIFICANT CHANGE UP (ref 43–77)
NRBC # BLD: 0 /100 WBCS — SIGNIFICANT CHANGE UP (ref 0–0)
NRBC # FLD: 0 K/UL — SIGNIFICANT CHANGE UP (ref 0–0)
OXYHGB MFR BLDA: 67.8 % — LOW (ref 90–95)
OXYHGB MFR BLDA: 93.7 % — SIGNIFICANT CHANGE UP (ref 90–95)
OXYHGB MFR BLDA: 97.8 % — HIGH (ref 90–95)
PCO2 BLDA: 39 MMHG — SIGNIFICANT CHANGE UP (ref 35–48)
PCO2 BLDA: 43 MMHG — SIGNIFICANT CHANGE UP (ref 35–48)
PH BLDA: 7.38 — SIGNIFICANT CHANGE UP (ref 7.35–7.45)
PH BLDA: 7.41 — SIGNIFICANT CHANGE UP (ref 7.35–7.45)
PHOSPHATE SERPL-MCNC: 4.1 MG/DL — SIGNIFICANT CHANGE UP (ref 2.5–4.5)
PLATELET # BLD AUTO: 149 K/UL — LOW (ref 150–400)
PO2 BLDA: 389 MMHG — HIGH (ref 83–108)
PO2 BLDA: 72 MMHG — LOW (ref 83–108)
POTASSIUM BLDA-SCNC: 4.1 MMOL/L — SIGNIFICANT CHANGE UP (ref 3.5–5.1)
POTASSIUM BLDA-SCNC: 4.2 MMOL/L — SIGNIFICANT CHANGE UP (ref 3.5–5.1)
POTASSIUM SERPL-MCNC: 4.2 MMOL/L — SIGNIFICANT CHANGE UP (ref 3.5–5.3)
POTASSIUM SERPL-SCNC: 4.2 MMOL/L — SIGNIFICANT CHANGE UP (ref 3.5–5.3)
PROT SERPL-MCNC: 6.7 G/DL — SIGNIFICANT CHANGE UP (ref 6–8.3)
RBC # BLD: 4.53 M/UL — SIGNIFICANT CHANGE UP (ref 4.2–5.8)
RBC # FLD: 13.6 % — SIGNIFICANT CHANGE UP (ref 10.3–14.5)
SAO2 % BLDA: 69.2 % — LOW (ref 94–98)
SAO2 % BLDA: 95.1 % — SIGNIFICANT CHANGE UP (ref 94–98)
SAO2 % BLDA: 99.5 % — HIGH (ref 94–98)
SODIUM BLDA-SCNC: 134 MMOL/L — LOW (ref 136–145)
SODIUM BLDA-SCNC: 137 MMOL/L — SIGNIFICANT CHANGE UP (ref 136–145)
SODIUM SERPL-SCNC: 136 MMOL/L — SIGNIFICANT CHANGE UP (ref 135–145)
WBC # BLD: 6.05 K/UL — SIGNIFICANT CHANGE UP (ref 3.8–10.5)
WBC # FLD AUTO: 6.05 K/UL — SIGNIFICANT CHANGE UP (ref 3.8–10.5)

## 2024-06-04 PROCEDURE — 75573 CT HRT C+ STRUX CGEN HRT DS: CPT | Mod: 26

## 2024-06-04 PROCEDURE — 99291 CRITICAL CARE FIRST HOUR: CPT

## 2024-06-04 PROCEDURE — 71045 X-RAY EXAM CHEST 1 VIEW: CPT | Mod: 26

## 2024-06-04 PROCEDURE — 93596 R&L HRT CATH CHD NML NT CNJ: CPT | Mod: 26,59

## 2024-06-04 PROCEDURE — 76937 US GUIDE VASCULAR ACCESS: CPT | Mod: 26,59

## 2024-06-04 PROCEDURE — 93568 NJX CAR CTH NSLC P-ART ANGRP: CPT | Mod: 59

## 2024-06-04 PROCEDURE — 33477 IMPLANT TCAT PULM VLV PERQ: CPT | Mod: 82

## 2024-06-04 PROCEDURE — 93563 NJX CGEN CAR CTH SLCTV C ANG: CPT | Mod: 59

## 2024-06-04 PROCEDURE — 93567 NJX CAR CTH SPRVLV AORTGRPHY: CPT | Mod: 59

## 2024-06-04 PROCEDURE — 75820 VEIN X-RAY ARM/LEG: CPT | Mod: 26

## 2024-06-04 PROCEDURE — 93566 NJX CAR CTH SLCTV RV/RA ANG: CPT | Mod: 59

## 2024-06-04 PROCEDURE — 33477 IMPLANT TCAT PULM VLV PERQ: CPT | Mod: 22

## 2024-06-04 PROCEDURE — 93569 NJX CTH SLCT P-ART ANGRP UNI: CPT

## 2024-06-04 PROCEDURE — 93010 ELECTROCARDIOGRAM REPORT: CPT

## 2024-06-04 RX ORDER — SACUBITRIL AND VALSARTAN 24; 26 MG/1; MG/1
1 TABLET, FILM COATED ORAL
Refills: 0 | DISCHARGE

## 2024-06-04 RX ORDER — ASPIRIN/CALCIUM CARB/MAGNESIUM 324 MG
81 TABLET ORAL DAILY
Refills: 0 | Status: DISCONTINUED | OUTPATIENT
Start: 2024-06-05 | End: 2024-06-05

## 2024-06-04 RX ORDER — CEFAZOLIN SODIUM 1 G
2000 VIAL (EA) INJECTION EVERY 8 HOURS
Refills: 0 | Status: COMPLETED | OUTPATIENT
Start: 2024-06-04 | End: 2024-06-05

## 2024-06-04 RX ORDER — ASPIRIN/CALCIUM CARB/MAGNESIUM 324 MG
1 TABLET ORAL
Refills: 0 | DISCHARGE

## 2024-06-04 RX ORDER — CHLORHEXIDINE GLUCONATE 213 G/1000ML
1 SOLUTION TOPICAL DAILY
Refills: 0 | Status: DISCONTINUED | OUTPATIENT
Start: 2024-06-04 | End: 2024-06-05

## 2024-06-04 RX ORDER — FUROSEMIDE 40 MG
1 TABLET ORAL
Qty: 30 | Refills: 0 | DISCHARGE

## 2024-06-04 RX ORDER — SPIRONOLACTONE 25 MG/1
1 TABLET, FILM COATED ORAL
Qty: 30 | Refills: 0 | DISCHARGE

## 2024-06-04 RX ORDER — SODIUM CHLORIDE 9 MG/ML
3 INJECTION INTRAMUSCULAR; INTRAVENOUS; SUBCUTANEOUS EVERY 8 HOURS
Refills: 0 | Status: DISCONTINUED | OUTPATIENT
Start: 2024-06-04 | End: 2024-06-05

## 2024-06-04 RX ADMIN — SODIUM CHLORIDE 3 MILLILITER(S): 9 INJECTION INTRAMUSCULAR; INTRAVENOUS; SUBCUTANEOUS at 21:41

## 2024-06-04 RX ADMIN — Medication 100 MILLIGRAM(S): at 17:49

## 2024-06-04 NOTE — PATIENT PROFILE ADULT - LEGAL HELP
Request for cx ray results faxed to [address] [place] [address] - fax # [phone]    Awaiting fax with results no

## 2024-06-04 NOTE — PROCEDURE NOTE - GENERAL PROCEDURE NAME
Left and Right Heart Catheterization with Conduit Stenting and TPVR with Brenda Valve Cardiac Catheterization, angiography conduit stenting and TPVR with Brenda Valve

## 2024-06-04 NOTE — PROCEDURE NOTE - ADDITIONAL PROCEDURE DETAILS
Access: 6 Fr RFV and 5 Fr LFA w US guidance.    Preliminary findings  Saturations (%):   SVC: 69, RPA: 66, Abby: 95    Qp: *** L/min/m2; Qs: *** L/min/m2  Qp:Qs: ***:***    Pressures (mmHg):   SVC: 12, RA: 13/13 (12), RV: 72/12, RPCW: 12/15 (12), RPA: 35/12 (20), LV: 89/6 (12), Abby: 94/55 (69)  Rp: *** iWu    Post-Intervention findings  Saturations (%):   SVC: 69  RPA: 66  Abby: 95    Qp: *** L/min/m2  Qs: *** L/min/m2  Qp:Qs: ***:***    Pressures (mmHg):   SVC: 12  RA: 13/13 (12)  RV: 72/12  RPCW: 12/15 (12)  RPA: 35/12 (20)  LV: 89/6 (12)  Abby: 94/55 (69)    Rp: *** iWu      Angiography/Interventions (Key findings):  Angiography had shown severe calcification along the conduit with RV pressures measuring ~2/3 systemic. Covered stents were placed and dilated along the conduit resulting in decreased RV pressure to ~1/3 systemic. Following stent placement a small, contained extravasation was noted. Another covered stent and decreased flow was appreciated so the Brenda valve was successfully placed. The small leak reappeared so Protamine sulfate was given with decreased flow appreciated. The patient remained hemodynamically stable throughout.     Assessment: Case Ayers is a 48 year old M with a history of rheumatic carditis s/p Ross Procedure and primary mitral valve repair who presented with conduit obstruction now s/p conduit stenting and Brenda valve placement. He underwent invasive assessment today which demonstrated severe conduit calcification with RV pressure ~2/3 systemic which decreased to ~1/3 systemic following conduit stenting and Brenda valve placement. During the procedure he was noted to have a small extravasation from the distal end of the stent following balloon dilation which had improved followed additional covered stent placement and Protamine administration. He is hemodynamically stable and will be admitted to the Adult Cardiac Unit for observation.    Recommendations:  - EKG and CXR this evening.  - Ancef for 2 more doses.  - Arranging for cardiac CT and echocardiogram tomorrow (6/5/24) to assess the conduit, Brenda valve placement, and extravasation.  - Patient will experience chest pain following valve placement. Please inform on call Cardiology fellow if the patient develops hypotension with tachycardia, concerning for internal bleeding.    - Will F/U with Dr. Monet Access: 6 Fr RFV and 5 Fr LFA w US guidance.    Preliminary findings  Saturations (%):   SVC: 69, RPA: 66, Abby: 95    Qp: 2.1 L/min/m2; Qs: 2.1 L/min/m2  Qp:Qs: 1:1    Pressures (mmHg):   SVC: 12, RA: 13/13 (12), RV: 72/12, RPCW: 12/15 (12), RPA: 35/12 (20), LV: 89/6 (12), Abby: 94/55 (69)  Rp: 3.8 iWu    Final Post-Intervention findings  Saturations (%):   Abby: 100    Pressures (mmHg):   Abby: 94/55 (69)  RV: 42/14  PA: 48/17 (27)    Angiography/Interventions (Key findings):  Angiography had shown severe calcification along the conduit with RV pressures measuring ~2/3 systemic. Covered stents were placed and dilated along the conduit resulting in decreased RV pressure to ~1/3 systemic. Following stent placement a small, contained conduit injury was noted. Another covered stent and decreased flow was appreciated so the Brenda valve was successfully placed. The small leak reappeared so Protamine sulfate was given with decreased flow appreciated. The patient remained hemodynamically stable throughout.     Assessment: Case Ayers is a 48 year old M with a history of rheumatic carditis s/p Ross Procedure and primary mitral valve repair who presented with conduit obstruction now s/p conduit stenting and Brenda valve placement. He underwent invasive assessment today which demonstrated severe conduit calcification with RV pressure ~2/3 systemic which decreased to ~1/3 systemic following conduit stenting and Brenda valve placement. During the procedure he was noted to have a small conduit injury from the distal end of the stent following balloon dilation which had improved followed additional covered stent placement and Protamine administration. He is hemodynamically stable and will be admitted to the Adult Cardiac Unit for observation.    Recommendations:  - EKG and CXR this evening.  - Ancef for 2 more doses.  - Arranging for cardiac CT and echocardiogram tomorrow (6/5/24) to assess the conduit, conduit injury, and Brenda valve placement.  - Patient will experience chest pain following valve placement. Please inform on call Cardiology fellow if the patient develops hypotension with tachycardia, concerning for internal bleeding.    - Will F/U with Dr. Rao in 4 weeks. Access: 6 Fr --> 10F --> 14 F --> 18 Fr --> 22F RFV and 5 Fr LFA w US guidance.    Preliminary findings  Saturations (%):   SVC: 69, RPA: 66, Abby: 95    Qp, Qs: 2.1 L/min/m2  Qp:Qs: 1:1    Pressures (mmHg):   SVC: 12, RA: 13/13 (12), RV: 72/12, RPCW: 12/15 (12), RPA: 35/12 (20), LV: 89/6 (12), Abby: 94/55 (69)  Rp: 3.8 iWu    Final Post-Intervention findings  Saturations (%):   Abby: 100    Pressures (mmHg):   Abby: 94/55 (69)  RV: 42/14  PA: 48/17 (27)    Angiography/Interventions (Key findings):  Severely calcified conduit s/p balloon angioplasty with 18mm x 2cm Avon Gold to 10atm demonstrating significant balloon waist with minimal diameter of 9-11mm; s/p balloon angioplasty with a 14mm x 2cm Avon Gold balloon (16atm) with resolution of balloon waist and coronary compression testing demonstrating LCA to be far removed from conduit; s/p stent angioplasty with 8 zig x 4.5cm CCPS on 16mm x 5.5cm BiB; s/p balloon angioplasty with 16mm x 4cm Avon Gold (16atm) with no residual waist; s/p stent angioplasty with 8zig x 5.5cm CCPS on 20mm x 6cm BiB; s/p balloon angioplasty with 18mm x 2cm Avon Gold (16 mark), 20mm x 2cm Avon Gold (16 mark), 22mm x 2cm Avon Gold (16 mark) with no residual waist; s/p stent angioplasty with 10 zig 5.5cm CCPS on 28 x 6cm BiB to low pressure; s/p implant of 22 Brenda TPV on a 22 Ensemble.    There was a contained conduit injury which could not be completely excluded via the covered stents.  10mg of protamine was given at the end of the procedure and repeat angiograms were performed x 3 over the course of 30-45 minutes at the end the procedure demonstrating progressively decreased flow into the area of injury with no enlargement.  Small amount of blood noted in ETT prior to extubation with no ongoing bleeding.    Assessment: Case Ayers is a 48 year old M with a history of rheumatic carditis s/p Ross Procedure (28mm homograft) and primary mitral valve repair who has developed progressive conduit obstruction and regurgitation.  Invasive assessment today demonstrated severe conduit calcification with RV pressure ~2/3 systemic and gradient of 30-35mmHg from the RV to the PA.  He also has high mPA pressure with mild elevation of PVRi and angiographically vasculopathic appearing PAs.  He underwent successful conduit angioplasty and stent implant followed by implant of Brenda TPV.  The RVSp decreased to <50% systemic post-intervention with trivial residual PI.  There was a small contained conduit injury that could not be completely excluded by covered stents but was stable in size and had decreasing amount of flow noted.  There was also a trivial amount of mike-stent leak via the calcium.  He is hemodynamically stable and will be admitted to the Adult CCU for observation.    Recommendations:  - EKG and CXR this evening.  - Ancef for 2 more doses.  - Cardiac CT and echocardiogram tomorrow (6/5/24) to assess the conduit, conduit injury, and Brenda valve placement.  - Patient may experience chest pain following valve placement. Please inform on call Cardiology fellow if the patient develops evidence of hemodynamic instability.  - Start ASA 81mg tomorrow.  - resume home medications.  - Will F/U with Dr. Rao in 4 weeks.

## 2024-06-04 NOTE — PATIENT PROFILE ADULT - FALL HARM RISK - HARM RISK INTERVENTIONS

## 2024-06-04 NOTE — CHART NOTE - NSCHARTNOTEFT_GEN_A_CORE
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 Now admitted to CCU for post-procedural monitoring.    MEDICATIONS:    aspirin 81 mg oral delayed release tablet: 1 tab(s) orally once a day  atorvastatin 20 mg oral tablet: 1 tab(s) orally once a day (at bedtime)  carvedilol 6.25 mg oral tablet: 1 tab(s) orally every 12 hours  furosemide 40 mg oral tablet: 1 tab(s) orally once a day  pantoprazole 40 mg oral delayed release tablet: 1 tab(s) orally once a day (before a meal)  sacubitril-valsartan 49 mg-51 mg oral tablet: 1 tab(s) orally 2 times a day    home and hospital  spironolactone 25 mg oral tablet: 1 tab(s) orally once a day    Vital Signs Last 24 Hrs  T(C): --  T(F): --  HR: --  BP: --  BP(mean): --  RR: --  SpO2: --    PHYSICAL EXAM:  GENERAL: NAD, lying in bed comfortably  HEENT: NC/AT  NECK: Supple, No JVD  CHEST/LUNG: CTAB, no increased WOB  HEART: RRR, no m/r/g  ABDOMEN: soft, NT, ND, BS+  EXTREMITIES:  2+ peripheral pulses, no edema  NERVOUS SYSTEM:  A&Ox3  SKIN: No rashes or lesions    =====Neuro======  - AOx3, no active issues    =====Cardiovascular=====   #Pulmonic valve regurgitation/stenosis  - s/p TAPVR 6/4/2024, admitted to CCU for post-procedural monitoring  - c/w Telemetry    =====Respiratory=====  - No active issues    =====GI/Nutrition======  #Diet  - DASH, low fat    ======/Renal======  - No active issues    ======ID======  - No active issues    ====HEME====  #DVT prophylaxis  - Lovenox 40mg QD ************************  Naeem Sen MD  Internal Medicine PGY-1  ************************    Patient is a 48y old  Male who presents s/p TAPVR    HPI: 47 y/o male, with PMHx of CAD-s/p DESx2 LAD 2016 (in [facility], by Dr Dumont), Rheumatic fever (s/p MV repair), s/p Bio AVR, CHF presented in 3/2024 w/ CHF exacerbation, 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 Now admitted to CCU for post-procedural monitoring.    MEDICATIONS:  · spironolactone 25 mg oral tablet: 1 tab(s) orally once a day  · furosemide 40 mg oral tablet: 1 tab(s) orally once a day  · carvedilol 6.25 mg oral tablet: 1 tab(s) orally every 12 hours  · aspirin 81 mg oral delayed release tablet: 1 tab(s) orally once a day  · sacubitril-valsartan 49 mg-51 mg oral tablet: 1 tab(s) orally 2 times a day    Vital Signs Last 24 Hrs  T(C): --  T(F): --  HR: --  BP: --  BP(mean): --  RR: --  SpO2: --    PHYSICAL EXAM:  GENERAL: NAD, lying in bed comfortably  HEENT: NC/AT  NECK: Supple, No JVD  CHEST/LUNG: CTAB, no increased WOB  HEART: RRR, no m/r/g  ABDOMEN: soft, NT, ND, BS+  EXTREMITIES:  2+ peripheral pulses, no edema  NERVOUS SYSTEM:  A&Ox3  SKIN: No rashes or lesions    =====Neuro======  - AOx3, no active issues    =====Cardiovascular=====   #Pulmonic valve regurgitation/stenosis  - s/p TAPVR 6/4/2024, admitted to CCU for post-procedural monitoring  - c/w Telemetry    =====Respiratory=====  - No active issues    =====GI/Nutrition======  #Diet  - DASH, low fat    ======/Renal======  - No active issues    ======ID======  - No active issues    ====HEME====  #DVT prophylaxis  - Lovenox 40mg QD ************************  Naeem Sen MD  Internal Medicine PGY-1  ************************    Patient is a 48y old  Male who presents s/p TAPVR    HPI: 49 y/o male, with PMHx of CAD-s/p DESx2 LAD 2016 (in [facility], by Dr Dumont), Rheumatic fever (s/p MV repair), s/p Bio AVR, CHF presented in 3/2024 w/ CHF exacerbation, 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 Now s/p Left and Right Heart Catheterization with Conduit Stenting and TPVR with Brenda Valve, admitted to CCU for post-procedural monitoring.    MEDICATIONS:  · spironolactone 25 mg oral tablet: 1 tab(s) orally once a day  · furosemide 40 mg oral tablet: 1 tab(s) orally once a day  · carvedilol 6.25 mg oral tablet: 1 tab(s) orally every 12 hours  · aspirin 81 mg oral delayed release tablet: 1 tab(s) orally once a day  · sacubitril-valsartan 49 mg-51 mg oral tablet: 1 tab(s) orally 2 times a day    Vital Signs Last 24 Hrs  T(C): --  T(F): --  HR: --  BP: --  BP(mean): --  RR: --  SpO2: --    PHYSICAL EXAM:  GENERAL: NAD, lying in bed comfortably  HEENT: NC/AT  NECK: Supple, No JVD  CHEST/LUNG: CTAB, no increased WOB  HEART: RRR, no m/r/g  ABDOMEN: soft, NT, ND, BS+  EXTREMITIES:  2+ peripheral pulses, no edema  NERVOUS SYSTEM:  A&Ox3  SKIN: No rashes or lesions    =====Neuro======  - AOx3, no active issues    =====Cardiovascular=====   #Pulmonic valve regurgitation/stenosis  - s/p TAPVR 6/4/2024, admitted to CCU for post-procedural monitoring  - c/w Telemetry    =====Respiratory=====  - No active issues    =====GI/Nutrition======  #Diet  - DASH, low fat    ======/Renal======  - No active issues    ======ID======  - No active issues    ====HEME====  #DVT prophylaxis  - Lovenox 40mg QD ************************  Naeem Sen MD  Internal Medicine PGY-1  ************************    Patient is a 48y old  Male who presents s/p TAPVR    HPI: 47 y/o male, with PMHx of CAD-s/p DESx2 LAD 2016 (in [facility], by Dr Dumont), Rheumatic fever (s/p MV repair), s/p Bio AVR, CHF presented in 3/2024 w/ CHF exacerbation, 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 Now s/p Left and Right Heart Catheterization with Conduit Stenting and TPVR with Brenda Valve, admitted to CCU for post-procedural monitoring.    MEDICATIONS:  · spironolactone 25 mg oral tablet: 1 tab(s) orally once a day  · furosemide 40 mg oral tablet: 1 tab(s) orally once a day  · carvedilol 6.25 mg oral tablet: 1 tab(s) orally every 12 hours  · aspirin 81 mg oral delayed release tablet: 1 tab(s) orally once a day  · sacubitril-valsartan 49 mg-51 mg oral tablet: 1 tab(s) orally 2 times a day    Vital Signs Last 24 Hrs  T(C): --  T(F): --  HR: --  BP: --  BP(mean): --  RR: --  SpO2: --    PHYSICAL EXAM:  GENERAL: NAD, lying in bed comfortably  HEENT: NC/AT  NECK: Supple, No JVD  CHEST/LUNG: CTAB, no increased WOB  HEART: RRR, no m/r/g  ABDOMEN: soft, NT, ND, BS+  EXTREMITIES:  2+ peripheral pulses, no edema  NERVOUS SYSTEM:  A&Ox3  SKIN: No rashes or lesions    =====Neuro======  - AOx3, no active issues    =====Cardiovascular=====   #Pulmonic valve regurgitation/stenosis  - s/p TAPVR 6/4/2024, admitted to CCU for post-procedural monitoring  - f/u CT Cardiac 6/5/24  - f/u TTE 6/5/24   - c/w Telemetry  - Start ASA 81mg 6/5/24    =====Respiratory=====  - No active issues    =====GI/Nutrition======  #Diet  - DASH, low fat    ======/Renal======  - No active issues    ======ID======  - No active issues    #Post-op Abx Prophylaxis  - Ancef 2g x1 given at 10:17, f/u with 2 additional doses q8h    ====HEME====  #DVT prophylaxis  - SCDs i/s/o recent procedure ************************  Naeem Sen MD  Internal Medicine PGY-1  ************************    Patient is a 48y old  Male who presents s/p TAPVR    HPI: 47 y/o male, with PMHx of CAD-s/p DESx2 LAD 2016 (in [facility], by Dr Dumont), Rheumatic fever (s/p MV repair), s/p Bio AVR, CHF presented in 3/2024 w/ CHF exacerbation, 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 Now s/p Left and Right Heart Catheterization with Conduit Stenting and TPVR with Brenda Valve, admitted to CCU for post-procedural monitoring.    MEDICATIONS:  · spironolactone 25 mg oral tablet: 1 tab(s) orally once a day  · furosemide 40 mg oral tablet: 1 tab(s) orally once a day  · carvedilol 6.25 mg oral tablet: 1 tab(s) orally every 12 hours  · aspirin 81 mg oral delayed release tablet: 1 tab(s) orally once a day  · sacubitril-valsartan 49 mg-51 mg oral tablet: 1 tab(s) orally 2 times a day    Vital Signs Last 24 Hrs  T(C): 36.1 (04 Jun 2024 09:15), Max: 36.4 (04 Jun 2024 06:52)  T(F): 97 (04 Jun 2024 09:15), Max: 97.6 (04 Jun 2024 06:52)  HR: 56 (04 Jun 2024 09:15) (56 - 59)  BP: 130/75 (04 Jun 2024 09:15) (115/63 - 130/75)  BP(mean): --  RR: 24 (04 Jun 2024 09:15) (16 - 24)  SpO2: 98% (04 Jun 2024 09:15) (97% - 98%)    Parameters below as of 04 Jun 2024 06:52  Patient On (Oxygen Delivery Method): room air      PHYSICAL EXAM:  GENERAL: NAD, lying in bed comfortably  HEENT: NC/AT  NECK: Supple, No JVD  CHEST/LUNG: CTAB, no increased WOB  HEART: RRR, no m/r/g  ABDOMEN: soft, NT, ND, BS+  EXTREMITIES:  2+ peripheral pulses, no edema  NERVOUS SYSTEM:  A&Ox3  SKIN: No rashes, midsternal longitudinal scar    =====Neuro======  - AOx3, no active issues    =====Cardiovascular=====   #Pulmonic valve regurgitation/stenosis  - s/p TAPVR 6/4/2024, admitted to CCU for post-procedural monitoring  - f/u EKG, CXR  - f/u CT Cardiac 6/5/24  - f/u TTE 6/5/24   - Start ASA 81mg 6/5/24  - c/w Telemetry    =====Respiratory=====  - No active issues    =====GI/Nutrition======  #Diet  - DASH, low fat    ======/Renal======  - No active issues    ======ID======  - No active issues    #Post-op Abx Prophylaxis  - Ancef 2g x1 given at 10:17, f/u with 2 additional doses q8h    ====HEME====  #DVT prophylaxis  - SCDs i/s/o recent procedure

## 2024-06-04 NOTE — PROCEDURE NOTE - GENERAL PROCEDURE DETAILS
Left and Right Heart Catheterization with Conduit Stenting and TPVR with Brenda Valve Cardiac Catheterization with Conduit Stenting and TPVR with Brenda Valve

## 2024-06-05 ENCOUNTER — TRANSCRIPTION ENCOUNTER (OUTPATIENT)
Age: 49
End: 2024-06-05

## 2024-06-05 ENCOUNTER — RESULT REVIEW (OUTPATIENT)
Age: 49
End: 2024-06-05

## 2024-06-05 VITALS
TEMPERATURE: 99 F | HEART RATE: 61 BPM | DIASTOLIC BLOOD PRESSURE: 62 MMHG | RESPIRATION RATE: 18 BRPM | SYSTOLIC BLOOD PRESSURE: 128 MMHG

## 2024-06-05 LAB
ALBUMIN SERPL ELPH-MCNC: 3.4 G/DL — SIGNIFICANT CHANGE UP (ref 3.3–5)
ALP SERPL-CCNC: 111 U/L — SIGNIFICANT CHANGE UP (ref 40–120)
ALT FLD-CCNC: 26 U/L — SIGNIFICANT CHANGE UP (ref 4–41)
ANION GAP SERPL CALC-SCNC: 13 MMOL/L — SIGNIFICANT CHANGE UP (ref 7–14)
AST SERPL-CCNC: 23 U/L — SIGNIFICANT CHANGE UP (ref 4–40)
BILIRUB SERPL-MCNC: 0.9 MG/DL — SIGNIFICANT CHANGE UP (ref 0.2–1.2)
BUN SERPL-MCNC: 18 MG/DL — SIGNIFICANT CHANGE UP (ref 7–23)
CALCIUM SERPL-MCNC: 9 MG/DL — SIGNIFICANT CHANGE UP (ref 8.4–10.5)
CHLORIDE SERPL-SCNC: 103 MMOL/L — SIGNIFICANT CHANGE UP (ref 98–107)
CO2 SERPL-SCNC: 21 MMOL/L — LOW (ref 22–31)
CREAT SERPL-MCNC: 0.88 MG/DL — SIGNIFICANT CHANGE UP (ref 0.5–1.3)
EGFR: 107 ML/MIN/1.73M2 — SIGNIFICANT CHANGE UP
GLUCOSE SERPL-MCNC: 130 MG/DL — HIGH (ref 70–99)
HCT VFR BLD CALC: 38.1 % — LOW (ref 39–50)
HGB BLD-MCNC: 12.9 G/DL — LOW (ref 13–17)
MAGNESIUM SERPL-MCNC: 1.9 MG/DL — SIGNIFICANT CHANGE UP (ref 1.6–2.6)
MCHC RBC-ENTMCNC: 29.5 PG — SIGNIFICANT CHANGE UP (ref 27–34)
MCHC RBC-ENTMCNC: 33.9 GM/DL — SIGNIFICANT CHANGE UP (ref 32–36)
MCV RBC AUTO: 87.2 FL — SIGNIFICANT CHANGE UP (ref 80–100)
NRBC # BLD: 0 /100 WBCS — SIGNIFICANT CHANGE UP (ref 0–0)
NRBC # FLD: 0 K/UL — SIGNIFICANT CHANGE UP (ref 0–0)
PHOSPHATE SERPL-MCNC: 4.5 MG/DL — SIGNIFICANT CHANGE UP (ref 2.5–4.5)
PLATELET # BLD AUTO: 156 K/UL — SIGNIFICANT CHANGE UP (ref 150–400)
POTASSIUM SERPL-MCNC: 3.9 MMOL/L — SIGNIFICANT CHANGE UP (ref 3.5–5.3)
POTASSIUM SERPL-SCNC: 3.9 MMOL/L — SIGNIFICANT CHANGE UP (ref 3.5–5.3)
PROT SERPL-MCNC: 6.4 G/DL — SIGNIFICANT CHANGE UP (ref 6–8.3)
RBC # BLD: 4.37 M/UL — SIGNIFICANT CHANGE UP (ref 4.2–5.8)
RBC # FLD: 13.5 % — SIGNIFICANT CHANGE UP (ref 10.3–14.5)
SODIUM SERPL-SCNC: 137 MMOL/L — SIGNIFICANT CHANGE UP (ref 135–145)
WBC # BLD: 6.17 K/UL — SIGNIFICANT CHANGE UP (ref 3.8–10.5)
WBC # FLD AUTO: 6.17 K/UL — SIGNIFICANT CHANGE UP (ref 3.8–10.5)

## 2024-06-05 PROCEDURE — 75573 CT HRT C+ STRUX CGEN HRT DS: CPT | Mod: 26

## 2024-06-05 PROCEDURE — 93325 DOPPLER ECHO COLOR FLOW MAPG: CPT | Mod: 26

## 2024-06-05 PROCEDURE — 93320 DOPPLER ECHO COMPLETE: CPT | Mod: 26

## 2024-06-05 PROCEDURE — 93303 ECHO TRANSTHORACIC: CPT | Mod: 26

## 2024-06-05 PROCEDURE — 99291 CRITICAL CARE FIRST HOUR: CPT

## 2024-06-05 RX ORDER — CARVEDILOL PHOSPHATE 80 MG/1
1 CAPSULE, EXTENDED RELEASE ORAL
Qty: 60 | Refills: 0 | DISCHARGE

## 2024-06-05 RX ORDER — MAGNESIUM SULFATE 500 MG/ML
1 VIAL (ML) INJECTION ONCE
Refills: 0 | Status: COMPLETED | OUTPATIENT
Start: 2024-06-05 | End: 2024-06-05

## 2024-06-05 RX ORDER — POTASSIUM CHLORIDE 20 MEQ
20 PACKET (EA) ORAL ONCE
Refills: 0 | Status: COMPLETED | OUTPATIENT
Start: 2024-06-05 | End: 2024-06-05

## 2024-06-05 RX ORDER — CARVEDILOL PHOSPHATE 80 MG/1
1 CAPSULE, EXTENDED RELEASE ORAL
Qty: 60 | Refills: 0
Start: 2024-06-05

## 2024-06-05 RX ADMIN — Medication 100 GRAM(S): at 05:14

## 2024-06-05 RX ADMIN — SODIUM CHLORIDE 3 MILLILITER(S): 9 INJECTION INTRAMUSCULAR; INTRAVENOUS; SUBCUTANEOUS at 13:20

## 2024-06-05 RX ADMIN — Medication 20 MILLIEQUIVALENT(S): at 05:14

## 2024-06-05 RX ADMIN — Medication 81 MILLIGRAM(S): at 12:10

## 2024-06-05 RX ADMIN — Medication 100 MILLIGRAM(S): at 02:10

## 2024-06-05 RX ADMIN — SODIUM CHLORIDE 3 MILLILITER(S): 9 INJECTION INTRAMUSCULAR; INTRAVENOUS; SUBCUTANEOUS at 05:04

## 2024-06-05 RX ADMIN — CHLORHEXIDINE GLUCONATE 1 APPLICATION(S): 213 SOLUTION TOPICAL at 12:12

## 2024-06-05 NOTE — DISCHARGE NOTE PROVIDER - NSDCCPCAREPLAN_GEN_ALL_CORE_FT
PRINCIPAL DISCHARGE DIAGNOSIS  Diagnosis: S/P pulmonary valve replacement  Assessment and Plan of Treatment: You were admitted to the hospital fr replacement of your Pulmonic valve, a well as stenting of your Right Ventricular conduit. The procedure was completed successfully and you were deemed stable for discharge. Please follow up with your primary care doctor and outpatient cardiologist.   If you start to experience symptoms including but not limited to: Chest pain, dizziness, shortness of breath, please return to the emergency room.

## 2024-06-05 NOTE — DISCHARGE NOTE PROVIDER - NSDCCPTREATMENT_GEN_ALL_CORE_FT
PRINCIPAL PROCEDURE  Procedure: XR chest, 1 view  Findings and Treatment: IMPRESSION: Cardiomegaly with clear lungs.

## 2024-06-05 NOTE — DISCHARGE NOTE NURSING/CASE MANAGEMENT/SOCIAL WORK - PATIENT PORTAL LINK FT
You can access the FollowMyHealth Patient Portal offered by [facility] by registering at the following website: [email or web address]. By joining [employer] [facility]’s FollowMyHealth portal, you will also be able to view your health information using other applications (apps) compatible with our system.

## 2024-06-05 NOTE — PROGRESS NOTE ADULT - ASSESSMENT
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 Now s/p Left and Right Heart Catheterization with Conduit Stenting and TPVR with Brenda Valve, admitted to CCU for post-procedural monitoring.    =====Neuro======  - AOx3, no active issues    =====Cardiovascular=====   #Pulmonic valve regurgitation/stenosis  - s/p TAPVR 6/4/2024, admitted to CCU for post-procedural monitoring  - CXR unremarkable  - LHC and RHC unremarkable  - c/w ASA 81mg  - f/u CT Cardiac  - f/u TTE   - c/w Telemetry      =====Respiratory=====  - No active issues    =====GI/Nutrition======  #Diet  - DASH, low fat    ======/Renal======  - No active issues    ======ID======  - No active issues    #Post-op Abx Prophylaxis  - Ancef 2g x3 doses    ====HEME====  #DVT prophylaxis  - SCDs i/s/o recent procedure.   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 Now s/p Left and Right Heart Catheterization with Conduit Stenting and TPVR with Brenda Valve, admitted to CCU for post-procedural monitoring.    =====Neuro======  - AOx3, no active issues    =====Cardiovascular=====   #Pulmonic valve regurgitation/stenosis  - s/p TAPVR 6/4/2024, admitted to CCU for post-procedural monitoring  - CXR unremarkable  - LHC and RHC unremarkable  - c/w ASA 81mg  - f/u CT Cardiac  - f/u TTE   - c/w Telemetry      =====Respiratory=====  - No active issues    =====GI/Nutrition======  #Diet  - DASH, low fat    ======/Renal======  - No active issues    ======ID======  - No active issues    #Post-op Abx Prophylaxis  - Ancef 2g x3 doses    ====HEME====  #DVT prophylaxis  - SCDs i/s/o recent procedure   49 y/o male, with PMHx of CAD-s/p DESx2 LAD 2016 (in [facility], by Dr Dumont), Rheumatic fever (s/p MV repair), s/p Bio AVR, CHF presented in 3/2024 w/ CHF exacerbation, 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 Now s/p Left and Right Heart Catheterization with Conduit Stenting and TPVR with Brenda Valve, admitted to CCU for post-procedural monitoring.    =====Neuro======  - AOx3, no active issues    =====Cardiovascular=====   #Pulmonic valve regurgitation/stenosis  - s/p TAPVR 6/4/2024, admitted to CCU for post-procedural monitoring  - CXR unremarkable  - LHC and RHC unremarkable  - c/w ASA 81mg  - f/u CT Cardiac  - f/u TTE   - c/w Telemetry      =====Respiratory=====  - No active issues    =====GI/Nutrition======  #Diet  - DASH, low fat    ======/Renal======  - No active issues    ======ID======  - No active issues    #Post-op Abx Prophylaxis  - Ancef 2g x3 doses    ====HEME====  #DVT prophylaxis  - SCDs i/s/o recent procedure

## 2024-06-05 NOTE — PROGRESS NOTE ADULT - CRITICAL CARE ATTENDING COMMENT
48 year old man with history of CAD (PCI to LAD in 2016 x 2) Rheumatic carditis sp Ross Procedure in 1995 and ?MVR 20 year ago. Presented with fluid overload and dyspnea in March and discovered RVOT (?conduit) obstruction and PI. Now sp TPVR and Conduit stenting    Cardiac CT  Peds TTE  DC planning

## 2024-06-05 NOTE — PROGRESS NOTE ADULT - SUBJECTIVE AND OBJECTIVE BOX
************************  Naeem Sen MD  Internal Medicine PGY-1  ************************    Patient is a 48y old  Male who presents for Post TAPVR and Conduit stenting monitoring    Overnight no events, this morning patient with no acute complaints. Patient urinating well with BM(s). Denies CP, SOB, fevers/chills, N/V, or abdominal pain. Patient reminded of ongoing careplan.    MEDICATIONS  (STANDING):  aspirin enteric coated 81 milliGRAM(s) Oral daily  chlorhexidine 2% Cloths 1 Application(s) Topical daily  sodium chloride 0.9% lock flush 3 milliLiter(s) IV Push every 8 hours    MEDICATIONS  (PRN):      CAPILLARY BLOOD GLUCOSE        I&O's Summary    04 Jun 2024 07:01  -  05 Jun 2024 07:00  --------------------------------------------------------  IN: 100 mL / OUT: 1425 mL / NET: -1325 mL        PHYSICAL EXAM:  Vital Signs Last 24 Hrs  T(C): 36.8 (05 Jun 2024 04:00), Max: 36.9 (05 Jun 2024 00:00)  T(F): 98.2 (05 Jun 2024 04:00), Max: 98.5 (05 Jun 2024 00:00)  HR: 59 (05 Jun 2024 06:00) (48 - 63)  BP: 106/57 (04 Jun 2024 15:30) (106/57 - 130/75)  BP(mean): --  RR: 20 (05 Jun 2024 04:00) (15 - 25)  SpO2: 100% (04 Jun 2024 23:00) (94% - 100%)    Parameters below as of 05 Jun 2024 06:00  Patient On (Oxygen Delivery Method): room air        PHYSICAL EXAM:  GENERAL: NAD, lying in bed comfortably  HEENT: NC/AT  NECK: Supple, No JVD  CHEST/LUNG: CTAB, no increased WOB  HEART: RRR, no m/r/g  ABDOMEN: soft, NT, ND, BS+  EXTREMITIES:  2+ peripheral pulses, no edema  NERVOUS SYSTEM:  A&Ox3  SKIN: No rashes or lesions    LABS:                        12.9   6.17  )-----------( 156      ( 05 Jun 2024 00:09 )             38.1     06-05    137  |  103  |  18  ----------------------------<  130<H>  3.9   |  21<L>  |  0.88    Ca    9.0      05 Jun 2024 00:09  Phos  4.5     06-05  Mg     1.90     06-05    TPro  6.4  /  Alb  3.4  /  TBili  0.9  /  DBili  x   /  AST  23  /  ALT  26  /  AlkPhos  111  06-05          Urinalysis Basic - ( 05 Jun 2024 00:09 )    Color: x / Appearance: x / SG: x / pH: x  Gluc: 130 mg/dL / Ketone: x  / Bili: x / Urobili: x   Blood: x / Protein: x / Nitrite: x   Leuk Esterase: x / RBC: x / WBC x   Sq Epi: x / Non Sq Epi: x / Bacteria: x           ************************  Naeem Sen MD  Internal Medicine PGY-1  ************************    Patient is a 48y old  Male who presents for Post TAPVR and Conduit stenting monitoring    Overnight no events, this morning patient with no acute complaints. Patient urinating well with BM(s). Denies CP, SOB, fevers/chills, N/V, or abdominal pain. Patient reminded of ongoing careplan.    MEDICATIONS  (STANDING):  aspirin enteric coated 81 milliGRAM(s) Oral daily  chlorhexidine 2% Cloths 1 Application(s) Topical daily  sodium chloride 0.9% lock flush 3 milliLiter(s) IV Push every 8 hours    MEDICATIONS  (PRN):      CAPILLARY BLOOD GLUCOSE        I&O's Summary    04 Jun 2024 07:01  -  05 Jun 2024 07:00  --------------------------------------------------------  IN: 100 mL / OUT: 1425 mL / NET: -1325 mL        PHYSICAL EXAM:  Vital Signs Last 24 Hrs  T(C): 36.8 (05 Jun 2024 04:00), Max: 36.9 (05 Jun 2024 00:00)  T(F): 98.2 (05 Jun 2024 04:00), Max: 98.5 (05 Jun 2024 00:00)  HR: 59 (05 Jun 2024 06:00) (48 - 63)  BP: 106/57 (04 Jun 2024 15:30) (106/57 - 130/75)  BP(mean): --  RR: 20 (05 Jun 2024 04:00) (15 - 25)  SpO2: 100% (04 Jun 2024 23:00) (94% - 100%)    Parameters below as of 05 Jun 2024 06:00  Patient On (Oxygen Delivery Method): room air        PHYSICAL EXAM:  GENERAL: NAD, lying in bed comfortably  HEENT: NC/AT  NECK: Supple, No JVD  CHEST/LUNG: CTAB, no increased WOB  HEART: RRR, no m/r/g  ABDOMEN: soft, NT, ND, BS+  EXTREMITIES:  2+ peripheral pulses, no edema  NERVOUS SYSTEM:  A&Ox3  SKIN: No rashes, b/l groin incisions CDI w/o erythema, midsternal scar    LABS:                        12.9   6.17  )-----------( 156      ( 05 Jun 2024 00:09 )             38.1     06-05    137  |  103  |  18  ----------------------------<  130<H>  3.9   |  21<L>  |  0.88    Ca    9.0      05 Jun 2024 00:09  Phos  4.5     06-05  Mg     1.90     06-05    TPro  6.4  /  Alb  3.4  /  TBili  0.9  /  DBili  x   /  AST  23  /  ALT  26  /  AlkPhos  111  06-05          Urinalysis Basic - ( 05 Jun 2024 00:09 )    Color: x / Appearance: x / SG: x / pH: x  Gluc: 130 mg/dL / Ketone: x  / Bili: x / Urobili: x   Blood: x / Protein: x / Nitrite: x   Leuk Esterase: x / RBC: x / WBC x   Sq Epi: x / Non Sq Epi: x / Bacteria: x           ************************  Naeem Sen MD  Internal Medicine PGY-1  ************************    Patient is a 48y old  Male who presents for Post TAPVR and Conduit stenting monitoring    Overnight no events, this morning patient with no acute complaints. Patient urinating well with BM(s). Denies CP, SOB, fevers/chills, N/V, or abdominal pain. Patient reminded of ongoing careplan.    MEDICATIONS  (STANDING):  aspirin enteric coated 81 milliGRAM(s) Oral daily  chlorhexidine 2% Cloths 1 Application(s) Topical daily  sodium chloride 0.9% lock flush 3 milliLiter(s) IV Push every 8 hours    MEDICATIONS  (PRN):      CAPILLARY BLOOD GLUCOSE        I&O's Summary    04 Jun 2024 07:01  -  05 Jun 2024 07:00  --------------------------------------------------------  IN: 100 mL / OUT: 1425 mL / NET: -1325 mL        PHYSICAL EXAM:  Vital Signs Last 24 Hrs  T(C): 36.8 (05 Jun 2024 04:00), Max: 36.9 (05 Jun 2024 00:00)  T(F): 98.2 (05 Jun 2024 04:00), Max: 98.5 (05 Jun 2024 00:00)  HR: 59 (05 Jun 2024 06:00) (48 - 63)  BP: 106/57 (04 Jun 2024 15:30) (106/57 - 130/75)  BP(mean): --  RR: 20 (05 Jun 2024 04:00) (15 - 25)  SpO2: 100% (04 Jun 2024 23:00) (94% - 100%)    Parameters below as of 05 Jun 2024 06:00  Patient On (Oxygen Delivery Method): room air        PHYSICAL EXAM:  GENERAL: NAD, lying in bed comfortably  HEENT: NC/AT  NECK: Supple, No JVD  CHEST/LUNG: CTAB, no increased WOB  HEART: Systolic ejection murmur  ABDOMEN: soft, NT, ND, BS+  EXTREMITIES:  2+ peripheral pulses, no edema  NERVOUS SYSTEM:  A&Ox3  SKIN: No rashes, b/l groin incisions CDI w/o erythema, midsternal scar    LABS:                        12.9   6.17  )-----------( 156      ( 05 Jun 2024 00:09 )             38.1     06-05    137  |  103  |  18  ----------------------------<  130<H>  3.9   |  21<L>  |  0.88    Ca    9.0      05 Jun 2024 00:09  Phos  4.5     06-05  Mg     1.90     06-05    TPro  6.4  /  Alb  3.4  /  TBili  0.9  /  DBili  x   /  AST  23  /  ALT  26  /  AlkPhos  111  06-05          Urinalysis Basic - ( 05 Jun 2024 00:09 )    Color: x / Appearance: x / SG: x / pH: x  Gluc: 130 mg/dL / Ketone: x  / Bili: x / Urobili: x   Blood: x / Protein: x / Nitrite: x   Leuk Esterase: x / RBC: x / WBC x   Sq Epi: x / Non Sq Epi: x / Bacteria: x

## 2024-06-05 NOTE — DISCHARGE NOTE PROVIDER - PROVIDER TOKENS
PROVIDER:[TOKEN:[[id number]],FOLLOWUP:[1 month],ESTABLISHEDPATIENT:[T]] PROVIDER:[TOKEN:[[id number]],FOLLOWUP:[1 month],ESTABLISHEDPATIENT:[T]],FREE:[LAST:[Nancie],FIRST:[Beverly],PHONE:[phone]],FAX:[(   )    -],ADDRESS:[[address] [place] [address]],FOLLOWUP:[2 weeks]]

## 2024-06-05 NOTE — DISCHARGE NOTE PROVIDER - CARE PROVIDER_API CALL
Zachery Rao  Pediatric Cardiology  [address] [address] [place] [id number]  Phone: [phone]  Fax: [phone]  Established Patient  Follow Up Time: 1 month   Zachery Rao  Pediatric Cardiology  [address] [address] [place] [id number]  Phone: [phone]  Fax: [phone]  Established Patient  Follow Up Time: 1 month    Beverly Canada  [address] [facility], [place]  Phone: [phone]  Fax: (   )    -  Follow Up Time: 2 weeks

## 2024-06-05 NOTE — PROGRESS NOTE PEDS - SUBJECTIVE AND OBJECTIVE BOX
POST ANESTHESIA EVALUATION    48y Male POSTOP DAY 1      MENTAL STATUS: Patient participation [ x ] Awake     [  ] Arousable     [  ] Sedated    AIRWAY PATENCY: [ x ] Satisfactory  [  ] Other:     Vital Signs Last 24 Hrs  T(C): 36.8 (05 Jun 2024 08:00), Max: 36.9 (05 Jun 2024 00:00)  T(F): 98.3 (05 Jun 2024 08:00), Max: 98.5 (05 Jun 2024 00:00)  HR: 64 (05 Jun 2024 08:00) (48 - 64)  BP: 116/59 (05 Jun 2024 08:00) (106/57 - 130/75)  BP(mean): 72 (05 Jun 2024 08:00) (72 - 72)  RR: 20 (05 Jun 2024 08:00) (15 - 25)  SpO2: 98% (05 Jun 2024 08:00) (94% - 100%)    Parameters below as of 05 Jun 2024 08:00  Patient On (Oxygen Delivery Method): room air      I&O's Summary    04 Jun 2024 07:01  -  05 Jun 2024 07:00  --------------------------------------------------------  IN: 100 mL / OUT: 1425 mL / NET: -1325 mL    05 Jun 2024 07:01  -  05 Jun 2024 09:01  --------------------------------------------------------  IN: 0 mL / OUT: 240 mL / NET: -240 mL          NAUSEA/ VOMITTING:  [ x ] NONE  [  ] CONTROLLED [  ] OTHER     PAIN: [ x ] CONTROLLED WITH CURRENT REGIMEN  [  ] OTHER    [  x] NO APPARENT ANESTHESIA COMPLICATIONS

## 2024-06-05 NOTE — PROGRESS NOTE ADULT - SUBJECTIVE AND OBJECTIVE BOX
POST ANESTHESIA EVALUATION    48y Male POSTOP DAY 1      MENTAL STATUS: Patient participation [ x ] Awake     [  ] Arousable     [  ] Sedated    AIRWAY PATENCY: [ x ] Satisfactory  [  ] Other:     Vital Signs Last 24 Hrs  T(C): 36.8 (05 Jun 2024 08:00), Max: 36.9 (05 Jun 2024 00:00)  T(F): 98.3 (05 Jun 2024 08:00), Max: 98.5 (05 Jun 2024 00:00)  HR: 64 (05 Jun 2024 08:00) (48 - 64)  BP: 116/59 (05 Jun 2024 08:00) (106/57 - 130/75)  BP(mean): 72 (05 Jun 2024 08:00) (72 - 72)  RR: 20 (05 Jun 2024 08:00) (15 - 25)  SpO2: 98% (05 Jun 2024 08:00) (94% - 100%)    Parameters below as of 05 Jun 2024 08:00  Patient On (Oxygen Delivery Method): room air      I&O's Summary    04 Jun 2024 07:01  -  05 Jun 2024 07:00  --------------------------------------------------------  IN: 100 mL / OUT: 1425 mL / NET: -1325 mL    05 Jun 2024 07:01  -  05 Jun 2024 09:03  --------------------------------------------------------  IN: 0 mL / OUT: 240 mL / NET: -240 mL          NAUSEA/ VOMITTING:  [ x ] NONE  [  ] CONTROLLED [  ] OTHER     PAIN: [ x ] CONTROLLED WITH CURRENT REGIMEN  [  ] OTHER    [ x ] NO APPARENT ANESTHESIA COMPLICATIONS

## 2024-06-05 NOTE — DISCHARGE NOTE PROVIDER - NSDCMRMEDTOKEN_GEN_ALL_CORE_FT
aspirin 81 mg oral delayed release tablet: 1 tab(s) orally once a day  carvedilol 6.25 mg oral tablet: 1 tab(s) orally every 12 hours  furosemide 40 mg oral tablet: 1 tab(s) orally once a day  sacubitril-valsartan 49 mg-51 mg oral tablet: 1 tab(s) orally 2 times a day  spironolactone 25 mg oral tablet: 1 tab(s) orally once a day   aspirin 81 mg oral delayed release tablet: 1 tab(s) orally once a day  furosemide 40 mg oral tablet: 1 tab(s) orally once a day  sacubitril-valsartan 49 mg-51 mg oral tablet: 1 tab(s) orally 2 times a day  spironolactone 25 mg oral tablet: 1 tab(s) orally once a day

## 2024-06-05 NOTE — DISCHARGE NOTE PROVIDER - HOSPITAL COURSE
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 6/4 presented from lab s/p stent and valve to RV conduit .  RV pressures measuring ~2/3 systemic. Covered stents were placed and dilated along the conduit resulting in decreased RV pressure to ~1/3 systemic. Following stent placement a small, contained extravasation was noted. Another covered stent and decreased flow was appreciated so the Brenda valve was successfully placed. The small leak reappeared so Protamine sulfate was given with decreased flow appreciated.  Transfer to CCU SB at 40s, EKG , chest XR ordered. Ancef 2gm q8h x3 doses was given. CT cardiac was completed to ensure no further extraversion which came back without acute pathology. TTE was also completed and within expected limits.     On day of discharge, patient is clinically stable with no new exam findings or acute symptoms compared to prior. The patient was seen by the attending physician on the date of discharge and deemed stable and acceptable for discharge. The patient's chronic medical conditions were treated accordingly per the patient's home medication regimen. The patient's medication reconciliation (with changes made to chronic medications), follow up appointments, discharge orders, instructions, and significant lab and diagnostic studies are as noted.    Patient will be discharged to home with close follow up.

## 2024-06-06 PROBLEM — I37.1 NONRHEUMATIC PULMONARY VALVE INSUFFICIENCY: Chronic | Status: ACTIVE | Noted: 2024-05-30

## 2024-07-02 PROBLEM — Z95.4 STATUS POST TRANSCATHETER REPLACEMENT OF PULMONARY VALVE: Status: ACTIVE | Noted: 2024-07-02

## 2024-07-02 PROBLEM — I37.0 PULMONARY STENOSIS: Status: ACTIVE | Noted: 2024-04-30

## 2024-07-02 PROBLEM — I37.1 PULMONARY REGURGITATION: Status: ACTIVE | Noted: 2024-04-30

## 2024-07-03 ENCOUNTER — APPOINTMENT (OUTPATIENT)
Dept: PEDIATRIC CARDIOLOGY | Facility: CLINIC | Age: 49
End: 2024-07-03
Payer: MEDICAID

## 2024-07-03 VITALS
WEIGHT: 277.12 LBS | HEIGHT: 68.9 IN | OXYGEN SATURATION: 99 % | BODY MASS INDEX: 41.04 KG/M2 | DIASTOLIC BLOOD PRESSURE: 72 MMHG | HEART RATE: 55 BPM | SYSTOLIC BLOOD PRESSURE: 108 MMHG

## 2024-07-03 DIAGNOSIS — Z95.4 PRESENCE OF OTHER HEART-VALVE REPLACEMENT: ICD-10-CM

## 2024-07-03 DIAGNOSIS — I37.1 NONRHEUMATIC PULMONARY VALVE INSUFFICIENCY: ICD-10-CM

## 2024-07-03 DIAGNOSIS — I37.0 NONRHEUMATIC PULMONARY VALVE STENOSIS: ICD-10-CM

## 2024-07-03 PROCEDURE — 93303 ECHO TRANSTHORACIC: CPT

## 2024-07-03 PROCEDURE — 93000 ELECTROCARDIOGRAM COMPLETE: CPT

## 2024-07-03 PROCEDURE — G2211 COMPLEX E/M VISIT ADD ON: CPT | Mod: NC

## 2024-07-03 PROCEDURE — 93325 DOPPLER ECHO COLOR FLOW MAPG: CPT

## 2024-07-03 PROCEDURE — 93321 DOPPLER ECHO F-UP/LMTD STD: CPT

## 2024-07-03 PROCEDURE — 99214 OFFICE O/P EST MOD 30 MIN: CPT | Mod: 25

## 2024-07-03 RX ORDER — ASPIRIN 81 MG
81 TABLET, DELAYED RELEASE (ENTERIC COATED) ORAL DAILY
Refills: 0 | Status: ACTIVE | COMMUNITY
Start: 2024-07-03

## 2024-07-29 NOTE — PATIENT PROFILE ADULT. - PRO MENTAL HEALTH SX RECENT
ADD/ADHD/BAM/Depression/Psych Medication Refill Request  Date of last Med Check/ Follow up visit: 07/29/24  Seen by : Alisha Fontanez MD  Follow up needed in: 4 MONTHS (10/24-11/24)  Follow up appointment made: Yes - date: 11/18/24    MEDICATION REQUESTED:  sertraline (ZOLOFT) 50 MG tablet   Rx last give on 07/26/24.  Disp: 90    Refills give:  0    PHARMACY :    [id number] [place], [place] - [address] AT [facility] [address]  [address] [address]  Phone: [phone] Fax: [phone]      Request routed to:  Alisha Fontanez MD    Notified mom, Rx sent at time of visit.     none

## 2025-01-07 NOTE — DISCHARGE NOTE PROVIDER - NSDCQMSTAIRS_GEN_ALL_CORE
Called patient and left message regarding overdue appointment last seen on 02/09/2024, patient did not answer,  anne-marie left   No

## 2025-01-08 ENCOUNTER — APPOINTMENT (OUTPATIENT)
Dept: PEDIATRIC CARDIOLOGY | Facility: CLINIC | Age: 50
End: 2025-01-08
Payer: MEDICAID

## 2025-01-08 VITALS
DIASTOLIC BLOOD PRESSURE: 64 MMHG | HEIGHT: 68.39 IN | SYSTOLIC BLOOD PRESSURE: 111 MMHG | HEART RATE: 62 BPM | OXYGEN SATURATION: 97 % | BODY MASS INDEX: 41.62 KG/M2 | WEIGHT: 277.78 LBS

## 2025-01-08 DIAGNOSIS — Z29.89 ENCOUNTER. FOR OTHER SPECIFIED PROPHYLACTIC MEASURES: ICD-10-CM

## 2025-01-08 DIAGNOSIS — Z95.4 PRESENCE OF OTHER HEART-VALVE REPLACEMENT: ICD-10-CM

## 2025-01-08 DIAGNOSIS — I37.0 NONRHEUMATIC PULMONARY VALVE STENOSIS: ICD-10-CM

## 2025-01-08 DIAGNOSIS — I37.1 NONRHEUMATIC PULMONARY VALVE INSUFFICIENCY: ICD-10-CM

## 2025-01-08 PROCEDURE — 93303 ECHO TRANSTHORACIC: CPT

## 2025-01-08 PROCEDURE — 93320 DOPPLER ECHO COMPLETE: CPT

## 2025-01-08 PROCEDURE — 99215 OFFICE O/P EST HI 40 MIN: CPT | Mod: 25

## 2025-01-08 PROCEDURE — 93000 ELECTROCARDIOGRAM COMPLETE: CPT

## 2025-01-08 PROCEDURE — 93325 DOPPLER ECHO COLOR FLOW MAPG: CPT

## 2025-03-12 ENCOUNTER — APPOINTMENT (OUTPATIENT)
Dept: CT IMAGING | Facility: HOSPITAL | Age: 50
End: 2025-03-12

## 2025-04-23 NOTE — H&P ADULT - NSICDXFAMILYHX_GEN_ALL_CORE_FT
Neulasta on-body injector: Yes: Verify any scheduled radiology, radiation, or hyperbaric appointments in the next 29 hours and reschedule as needed ensuring device is removed OR notify provider to change order to manual injection appointment, or delay on-body injector application.      Drug Name: 5FU  Cycle/Day: 11/3  ECOG   ECOG Performance Status        Oral Chemotherapy: No    Nursing Assessment:  A comprehensive nursing assessment was performed and the patient reports the following:    Anxiety/Depression/Insomnia:  None  Pain: NO    Toxicity Assessment:    Auditory/Ear  Assessment: Yes (Within Defined Limits)    Cardiac General  Assessment: Yes (Within Defined Limits)    Constitutional  Assessment: Yes (Within Defined Limits)    Dermatology/Skin  Assessment: Yes (Within Defined Limits)    Endocrine  Assessment: Yes (Within Defined Limits)    Gastrointestinal  Assessment: Yes (Within Defined Limits)    Hemorrhage/Bleeding  Assessment: Yes (Within Defined Limits)    Infection  Assessment: Yes (Within Defined Limits)    Lymphatics  Assessment: Yes (Within Defined Limits)    Musculoskeletal  Assessment: Yes (Within Defined Limits)    Neurology  Assessment: Yes (Within Defined Limits)    Ocular  Assessment: Yes (Within Defined Limits)    Pain  Assessment: Yes (Within Defined Limits)    Pulmonary/Upper Respiratory  Assessment: Yes (Within Defined Limits)    Genitourinary  Assessment: Yes (Within Defined Limits)        Infusion Pump Details: See Documentation Flowsheet     Central Line Care: See Invasive (Oncology) Lines Flowsheet and MAR for CVAD documentation as appropriate.    Education:  No new instructions needed    Next appointment scheduled: 5/5/25  Patient instructed to call the office with any questions or concerns.    Discharged: Stable     Yasar is supervising clinician today.     
FAMILY HISTORY:  No pertinent family history in first degree relatives

## 2025-07-01 ENCOUNTER — APPOINTMENT (OUTPATIENT)
Dept: PEDIATRIC CARDIOLOGY | Facility: CLINIC | Age: 50
End: 2025-07-01